# Patient Record
Sex: FEMALE | Race: WHITE | NOT HISPANIC OR LATINO | Employment: FULL TIME | URBAN - METROPOLITAN AREA
[De-identification: names, ages, dates, MRNs, and addresses within clinical notes are randomized per-mention and may not be internally consistent; named-entity substitution may affect disease eponyms.]

---

## 2017-11-22 ENCOUNTER — CLINICAL SUPPORT (OUTPATIENT)
Dept: INTERNAL MEDICINE | Facility: CLINIC | Age: 62
End: 2017-11-22

## 2017-11-22 ENCOUNTER — TELEPHONE (OUTPATIENT)
Dept: INTERNAL MEDICINE | Facility: CLINIC | Age: 62
End: 2017-11-22

## 2017-11-22 DIAGNOSIS — Z00.00 ROUTINE GENERAL MEDICAL EXAMINATION AT A HEALTH CARE FACILITY: ICD-10-CM

## 2017-11-22 DIAGNOSIS — Z00.00 ROUTINE GENERAL MEDICAL EXAMINATION AT A HEALTH CARE FACILITY: Primary | ICD-10-CM

## 2017-11-22 LAB
25(OH)D3 SERPL-MCNC: 45 NG/ML
ALBUMIN SERPL-MCNC: 4.5 G/DL (ref 3.2–4.8)
ALBUMIN/GLOB SERPL: 2 G/DL (ref 1.5–2.5)
ALP SERPL-CCNC: 90 U/L (ref 25–100)
ALT SERPL W P-5'-P-CCNC: 29 U/L (ref 7–40)
ANION GAP SERPL CALCULATED.3IONS-SCNC: 3 MMOL/L (ref 3–11)
ARTICHOKE IGE QN: 147 MG/DL (ref 0–130)
AST SERPL-CCNC: 38 U/L (ref 0–33)
BASOPHILS # BLD AUTO: 0.03 10*3/MM3 (ref 0–0.2)
BASOPHILS NFR BLD AUTO: 0.6 % (ref 0–1)
BILIRUB SERPL-MCNC: 0.7 MG/DL (ref 0.3–1.2)
BUN BLD-MCNC: 14 MG/DL (ref 9–23)
BUN/CREAT SERPL: 15.6 (ref 7–25)
CALCIUM SPEC-SCNC: 9.7 MG/DL (ref 8.7–10.4)
CHLORIDE SERPL-SCNC: 98 MMOL/L (ref 99–109)
CHOLEST SERPL-MCNC: 254 MG/DL (ref 0–200)
CO2 SERPL-SCNC: 32 MMOL/L (ref 20–31)
CREAT BLD-MCNC: 0.9 MG/DL (ref 0.6–1.3)
DEPRECATED RDW RBC AUTO: 45.8 FL (ref 37–54)
EOSINOPHIL # BLD AUTO: 0.12 10*3/MM3 (ref 0–0.3)
EOSINOPHIL NFR BLD AUTO: 2.3 % (ref 0–3)
ERYTHROCYTE [DISTWIDTH] IN BLOOD BY AUTOMATED COUNT: 12.7 % (ref 11.3–14.5)
GFR SERPL CREATININE-BSD FRML MDRD: 63 ML/MIN/1.73
GLOBULIN UR ELPH-MCNC: 2.2 GM/DL
GLUCOSE BLD-MCNC: 87 MG/DL (ref 70–100)
HCT VFR BLD AUTO: 42.3 % (ref 34.5–44)
HDLC SERPL-MCNC: 98 MG/DL (ref 40–60)
HGB BLD-MCNC: 14.2 G/DL (ref 11.5–15.5)
IMM GRANULOCYTES # BLD: 0.01 10*3/MM3 (ref 0–0.03)
IMM GRANULOCYTES NFR BLD: 0.2 % (ref 0–0.6)
LYMPHOCYTES # BLD AUTO: 2.03 10*3/MM3 (ref 0.6–4.8)
LYMPHOCYTES NFR BLD AUTO: 39 % (ref 24–44)
MCH RBC QN AUTO: 33 PG (ref 27–31)
MCHC RBC AUTO-ENTMCNC: 33.6 G/DL (ref 32–36)
MCV RBC AUTO: 98.4 FL (ref 80–99)
MONOCYTES # BLD AUTO: 0.49 10*3/MM3 (ref 0–1)
MONOCYTES NFR BLD AUTO: 9.4 % (ref 0–12)
NEUTROPHILS # BLD AUTO: 2.52 10*3/MM3 (ref 1.5–8.3)
NEUTROPHILS NFR BLD AUTO: 48.5 % (ref 41–71)
PLATELET # BLD AUTO: 186 10*3/MM3 (ref 150–450)
PMV BLD AUTO: 9.6 FL (ref 6–12)
POTASSIUM BLD-SCNC: 4.3 MMOL/L (ref 3.5–5.5)
PROT SERPL-MCNC: 6.7 G/DL (ref 5.7–8.2)
RBC # BLD AUTO: 4.3 10*6/MM3 (ref 3.89–5.14)
SODIUM BLD-SCNC: 133 MMOL/L (ref 132–146)
TRIGL SERPL-MCNC: 64 MG/DL (ref 0–150)
TSH SERPL DL<=0.05 MIU/L-ACNC: 2.74 MIU/ML (ref 0.35–5.35)
VIT B12 BLD-MCNC: 1655 PG/ML (ref 211–911)
WBC NRBC COR # BLD: 5.2 10*3/MM3 (ref 3.5–10.8)

## 2017-11-22 PROCEDURE — 85025 COMPLETE CBC W/AUTO DIFF WBC: CPT | Performed by: INTERNAL MEDICINE

## 2017-11-22 PROCEDURE — 82306 VITAMIN D 25 HYDROXY: CPT | Performed by: INTERNAL MEDICINE

## 2017-11-22 PROCEDURE — 80053 COMPREHEN METABOLIC PANEL: CPT | Performed by: INTERNAL MEDICINE

## 2017-11-22 PROCEDURE — 82607 VITAMIN B-12: CPT | Performed by: INTERNAL MEDICINE

## 2017-11-22 PROCEDURE — 80061 LIPID PANEL: CPT | Performed by: INTERNAL MEDICINE

## 2017-11-22 PROCEDURE — 84443 ASSAY THYROID STIM HORMONE: CPT | Performed by: INTERNAL MEDICINE

## 2017-11-29 ENCOUNTER — OFFICE VISIT (OUTPATIENT)
Dept: INTERNAL MEDICINE | Facility: CLINIC | Age: 62
End: 2017-11-29

## 2017-11-29 VITALS
HEIGHT: 67 IN | RESPIRATION RATE: 12 BRPM | OXYGEN SATURATION: 98 % | DIASTOLIC BLOOD PRESSURE: 68 MMHG | BODY MASS INDEX: 24.17 KG/M2 | HEART RATE: 64 BPM | WEIGHT: 154 LBS | SYSTOLIC BLOOD PRESSURE: 114 MMHG | TEMPERATURE: 97.3 F

## 2017-11-29 DIAGNOSIS — Z78.0 POSTMENOPAUSAL: ICD-10-CM

## 2017-11-29 DIAGNOSIS — Z00.00 ANNUAL PHYSICAL EXAM: Primary | ICD-10-CM

## 2017-11-29 DIAGNOSIS — M25.552 LEFT HIP PAIN: ICD-10-CM

## 2017-11-29 DIAGNOSIS — Z01.419 ENCOUNTER FOR GYNECOLOGICAL EXAMINATION WITHOUT ABNORMAL FINDING: ICD-10-CM

## 2017-11-29 LAB
BILIRUB BLD-MCNC: NEGATIVE MG/DL
CLARITY, POC: CLEAR
COLOR UR: YELLOW
DEVELOPER EXPIRATION DATE: 0
DEVELOPER LOT NUMBER: 0
EXPIRATION DATE: 0
FECAL OCCULT BLOOD SCREEN, POC: NEGATIVE
GLUCOSE UR STRIP-MCNC: NEGATIVE MG/DL
KETONES UR QL: NEGATIVE
LEUKOCYTE EST, POC: NEGATIVE
Lab: 0
NEGATIVE CONTROL: NEGATIVE
NITRITE UR-MCNC: NEGATIVE MG/ML
PH UR: 6 [PH] (ref 5–8)
POSITIVE CONTROL: POSITIVE
PROT UR STRIP-MCNC: NEGATIVE MG/DL
RBC # UR STRIP: NEGATIVE /UL
SP GR UR: 1.01 (ref 1–1.03)
UROBILINOGEN UR QL: NORMAL

## 2017-11-29 PROCEDURE — 81003 URINALYSIS AUTO W/O SCOPE: CPT | Performed by: INTERNAL MEDICINE

## 2017-11-29 PROCEDURE — 99396 PREV VISIT EST AGE 40-64: CPT | Performed by: INTERNAL MEDICINE

## 2017-11-29 PROCEDURE — 82270 OCCULT BLOOD FECES: CPT | Performed by: INTERNAL MEDICINE

## 2017-11-29 RX ORDER — VALACYCLOVIR HYDROCHLORIDE 1 G/1
TABLET, FILM COATED ORAL
Qty: 90 TABLET | Refills: 3 | Status: SHIPPED | OUTPATIENT
Start: 2017-11-29 | End: 2017-11-29 | Stop reason: SDUPTHER

## 2017-11-29 RX ORDER — VALACYCLOVIR HYDROCHLORIDE 1 G/1
1000 TABLET, FILM COATED ORAL DAILY
Qty: 90 TABLET | Refills: 3 | Status: SHIPPED | OUTPATIENT
Start: 2017-11-29 | End: 2018-11-30

## 2017-11-29 NOTE — PROGRESS NOTES
"Subjective   Palma Francisco is a 62 y.o. female.     History of Present Illness     Here for physical/pap     L lateral  hip pain on and off for several months. Will use aleve prn and helps some. Hurts going up and down stairs, and getting out of a chair. Also laying on it at night hurts. Could get up to 5-6/10 in severity.  R wrist hurts some, no numbness and tingling.     Exercise - tries to walk regualrly, gets 10,000 steps 5d/week, and goes to [employer]  Diet - healthy generally, but has some restrictions w/ her GI problems. Some red meat Taking supplements - B12, has been off Caltrate for about 2 months (ran out). MVi daily    The following portions of the patient's history were reviewed and updated as appropriate: allergies, current medications, past family history, past medical history, past social history, past surgical history and problem list.    Review of Systems   Constitutional: Negative for activity change, appetite change, fatigue, fever and unexpected weight change.   HENT: Negative.    Respiratory: Negative for shortness of breath.    Cardiovascular: Negative for chest pain and leg swelling.   Gastrointestinal: Negative for abdominal pain.        H/o IBS   Genitourinary:        Not using the premarin vaginal cream, didn't think it made much difference   Musculoskeletal: Positive for arthralgias (L hip intermittently). Negative for back pain and joint swelling.   Skin: Negative.    Hematological: Negative.        Objective   Blood pressure 114/68, pulse 64, temperature 97.3 °F (36.3 °C), temperature source Temporal Artery , resp. rate 12, height 66.7\" (169.4 cm), weight 154 lb (69.9 kg), SpO2 98 %.  Physical Exam   Constitutional: She is oriented to person, place, and time. She appears well-developed and well-nourished. No distress.   HENT:   Head: Normocephalic and atraumatic.   Right Ear: Tympanic membrane and external ear normal.   Left Ear: Tympanic membrane and external ear normal.   Mouth/Throat: " Oropharynx is clear and moist. No oropharyngeal exudate.   Eyes: Conjunctivae and EOM are normal. Pupils are equal, round, and reactive to light.   Neck: Neck supple. Carotid bruit is not present. No thyromegaly present.   Cardiovascular: Normal rate, regular rhythm and intact distal pulses.    No murmur heard.  Pulmonary/Chest: Effort normal and breath sounds normal. No accessory muscle usage. No respiratory distress. She has no wheezes. She has no rales. Right breast exhibits no inverted nipple, no mass, no nipple discharge and no tenderness. Left breast exhibits no inverted nipple, no mass, no nipple discharge and no tenderness. Breasts are symmetrical.   Abdominal: Soft. Bowel sounds are normal. She exhibits no distension and no mass. There is no tenderness.   Genitourinary: Vagina normal and uterus normal. Rectal exam shows guaiac negative stool. No vaginal discharge found.   Musculoskeletal: Normal range of motion. She exhibits no edema or deformity.   Tender over L hip. Hip int rotation is normal   Lymphadenopathy:     She has no cervical adenopathy.   Neurological: She is alert and oriented to person, place, and time. She has normal reflexes.   Skin: Skin is warm and dry. No rash noted.   Psychiatric: She has a normal mood and affect. Her behavior is normal. Judgment and thought content normal.     Labs reviewed and look good overall - LDL up just a little, but HDL good.     Assessment/Plan   Palma was seen today for annual exam.    Diagnoses and all orders for this visit:    Annual physical exam -DEXA due in 12/17 - will order. Had zostavax 9/13. Colon was done in 10/13 (polyp, Dr Gamboa) - f/u probably '18. Dt due in '24.Regular exercise/healthy diet. BSE q month. Sunscreen use encouraged. She has already had flu vaccine this fall. She has lisbeth scheduled for 12/17. Can decrease her B12 dose  -     POCT urinalysis dipstick, automated  -     POC Occult Blood Stool  -     Liquid-based Pap Smear, Screening -  ThinPrep Vial, Cervix; Future    Encounter for gynecological examination without abnormal finding  -     POC Occult Blood Stool  -     Liquid-based Pap Smear, Screening - ThinPrep Vial, Cervix; Future    Left hip pain - ? bursitis  -     XR Hip With or Without Pelvis 2 - 3 View Left    Postmenopausal  -     DEXA Bone Density Axial

## 2017-12-15 ENCOUNTER — HOSPITAL ENCOUNTER (OUTPATIENT)
Dept: GENERAL RADIOLOGY | Facility: HOSPITAL | Age: 62
Discharge: HOME | End: 2017-12-15
Admitting: INTERNAL MEDICINE

## 2017-12-15 ENCOUNTER — TELEPHONE (OUTPATIENT)
Dept: INTERNAL MEDICINE | Facility: CLINIC | Age: 62
End: 2017-12-15

## 2017-12-15 DIAGNOSIS — R21 RASH: ICD-10-CM

## 2017-12-15 PROCEDURE — 73502 X-RAY EXAM HIP UNI 2-3 VIEWS: CPT

## 2017-12-18 ENCOUNTER — TELEPHONE (OUTPATIENT)
Dept: INTERNAL MEDICINE | Facility: CLINIC | Age: 62
End: 2017-12-18

## 2017-12-18 NOTE — TELEPHONE ENCOUNTER
----- Message from Mikayla Branham MD sent at 12/18/2017  7:51 AM EST -----  Can you let her know, the hip xray does show mild arthritis in both hips. Would she like to see orthopedics?

## 2017-12-19 NOTE — TELEPHONE ENCOUNTER
Ortho could do an injection in the hip if they feel bursitis is more the cause of her symptoms then the arthritis, but it would not hurt to try PT, so if she would like to do PT, I can put in order for this

## 2017-12-26 ENCOUNTER — HOSPITAL ENCOUNTER (OUTPATIENT)
Dept: BONE DENSITY | Facility: HOSPITAL | Age: 62
Discharge: HOME | End: 2017-12-26
Admitting: INTERNAL MEDICINE

## 2017-12-26 PROCEDURE — 77080 DXA BONE DENSITY AXIAL: CPT

## 2018-10-09 ENCOUNTER — TELEPHONE (OUTPATIENT)
Dept: INTERNAL MEDICINE | Facility: CLINIC | Age: 63
End: 2018-10-09

## 2018-10-09 DIAGNOSIS — H91.90 DECREASED HEARING, UNSPECIFIED LATERALITY: Primary | ICD-10-CM

## 2018-10-09 NOTE — TELEPHONE ENCOUNTER
PT WANTS HEARING TEST SHE STATES SHE NEEDS AUTH SENT TO [facility] FAX NUMBER IS [phone]

## 2018-11-30 ENCOUNTER — OFFICE VISIT (OUTPATIENT)
Dept: INTERNAL MEDICINE | Facility: CLINIC | Age: 63
End: 2018-11-30

## 2018-11-30 VITALS
SYSTOLIC BLOOD PRESSURE: 126 MMHG | TEMPERATURE: 98.4 F | BODY MASS INDEX: 23.04 KG/M2 | WEIGHT: 146.8 LBS | DIASTOLIC BLOOD PRESSURE: 78 MMHG | HEIGHT: 67 IN | OXYGEN SATURATION: 98 % | HEART RATE: 58 BPM

## 2018-11-30 DIAGNOSIS — I49.9 IRREGULAR HEART BEATS: ICD-10-CM

## 2018-11-30 DIAGNOSIS — Z23 NEED FOR HEPATITIS A IMMUNIZATION: ICD-10-CM

## 2018-11-30 DIAGNOSIS — Z00.00 ENCOUNTER FOR ANNUAL PHYSICAL EXAM: Primary | ICD-10-CM

## 2018-11-30 DIAGNOSIS — E04.1 THYROID NODULE: ICD-10-CM

## 2018-11-30 LAB
ALBUMIN SERPL-MCNC: 4.38 G/DL (ref 3.2–4.8)
ALBUMIN/GLOB SERPL: 2.3 G/DL (ref 1.5–2.5)
ALP SERPL-CCNC: 76 U/L (ref 25–100)
ALT SERPL W P-5'-P-CCNC: 25 U/L (ref 7–40)
ANION GAP SERPL CALCULATED.3IONS-SCNC: 9 MMOL/L (ref 3–11)
ARTICHOKE IGE QN: 129 MG/DL (ref 0–130)
AST SERPL-CCNC: 32 U/L (ref 0–33)
BASOPHILS # BLD AUTO: 0.02 10*3/MM3 (ref 0–0.2)
BASOPHILS NFR BLD AUTO: 0.4 % (ref 0–1)
BILIRUB BLD-MCNC: NEGATIVE MG/DL
BILIRUB SERPL-MCNC: 0.7 MG/DL (ref 0.3–1.2)
BUN BLD-MCNC: 14 MG/DL (ref 9–23)
BUN/CREAT SERPL: 18.2 (ref 7–25)
CALCIUM SPEC-SCNC: 9.1 MG/DL (ref 8.7–10.4)
CHLORIDE SERPL-SCNC: 93 MMOL/L (ref 99–109)
CHOLEST SERPL-MCNC: 241 MG/DL (ref 0–200)
CLARITY, POC: CLEAR
CO2 SERPL-SCNC: 26 MMOL/L (ref 20–31)
COLOR UR: YELLOW
CREAT BLD-MCNC: 0.77 MG/DL (ref 0.6–1.3)
DEPRECATED RDW RBC AUTO: 46.4 FL (ref 37–54)
EOSINOPHIL # BLD AUTO: 0.06 10*3/MM3 (ref 0–0.3)
EOSINOPHIL NFR BLD AUTO: 1.1 % (ref 0–3)
ERYTHROCYTE [DISTWIDTH] IN BLOOD BY AUTOMATED COUNT: 12.8 % (ref 11.3–14.5)
GFR SERPL CREATININE-BSD FRML MDRD: 75 ML/MIN/1.73
GLOBULIN UR ELPH-MCNC: 1.9 GM/DL
GLUCOSE BLD-MCNC: 83 MG/DL (ref 70–100)
GLUCOSE UR STRIP-MCNC: NEGATIVE MG/DL
HCT VFR BLD AUTO: 39.6 % (ref 34.5–44)
HDLC SERPL-MCNC: 93 MG/DL (ref 40–60)
HGB BLD-MCNC: 13.5 G/DL (ref 11.5–15.5)
IMM GRANULOCYTES # BLD: 0.01 10*3/MM3 (ref 0–0.03)
IMM GRANULOCYTES NFR BLD: 0.2 % (ref 0–0.6)
KETONES UR QL: NEGATIVE
LEUKOCYTE EST, POC: NEGATIVE
LYMPHOCYTES # BLD AUTO: 1.64 10*3/MM3 (ref 0.6–4.8)
LYMPHOCYTES NFR BLD AUTO: 30.6 % (ref 24–44)
MCH RBC QN AUTO: 33.5 PG (ref 27–31)
MCHC RBC AUTO-ENTMCNC: 34.1 G/DL (ref 32–36)
MCV RBC AUTO: 98.3 FL (ref 80–99)
MONOCYTES # BLD AUTO: 0.5 10*3/MM3 (ref 0–1)
MONOCYTES NFR BLD AUTO: 9.3 % (ref 0–12)
NEUTROPHILS # BLD AUTO: 3.13 10*3/MM3 (ref 1.5–8.3)
NEUTROPHILS NFR BLD AUTO: 58.4 % (ref 41–71)
NITRITE UR-MCNC: NEGATIVE MG/ML
PH UR: 6 [PH] (ref 5–8)
PLATELET # BLD AUTO: 204 10*3/MM3 (ref 150–450)
PMV BLD AUTO: 9.5 FL (ref 6–12)
POTASSIUM BLD-SCNC: 3.9 MMOL/L (ref 3.5–5.5)
PROT SERPL-MCNC: 6.3 G/DL (ref 5.7–8.2)
PROT UR STRIP-MCNC: NEGATIVE MG/DL
RBC # BLD AUTO: 4.03 10*6/MM3 (ref 3.89–5.14)
RBC # UR STRIP: NEGATIVE /UL
SODIUM BLD-SCNC: 128 MMOL/L (ref 132–146)
SP GR UR: 1.01 (ref 1–1.03)
TRIGL SERPL-MCNC: 50 MG/DL (ref 0–150)
TSH SERPL DL<=0.05 MIU/L-ACNC: 1.78 MIU/ML (ref 0.35–5.35)
UROBILINOGEN UR QL: NORMAL
WBC NRBC COR # BLD: 5.36 10*3/MM3 (ref 3.5–10.8)

## 2018-11-30 PROCEDURE — 90471 IMMUNIZATION ADMIN: CPT | Performed by: INTERNAL MEDICINE

## 2018-11-30 PROCEDURE — 99397 PER PM REEVAL EST PAT 65+ YR: CPT | Performed by: INTERNAL MEDICINE

## 2018-11-30 PROCEDURE — 93000 ELECTROCARDIOGRAM COMPLETE: CPT | Performed by: INTERNAL MEDICINE

## 2018-11-30 PROCEDURE — 81003 URINALYSIS AUTO W/O SCOPE: CPT | Performed by: INTERNAL MEDICINE

## 2018-11-30 PROCEDURE — 80053 COMPREHEN METABOLIC PANEL: CPT | Performed by: INTERNAL MEDICINE

## 2018-11-30 PROCEDURE — 84443 ASSAY THYROID STIM HORMONE: CPT | Performed by: INTERNAL MEDICINE

## 2018-11-30 PROCEDURE — 80061 LIPID PANEL: CPT | Performed by: INTERNAL MEDICINE

## 2018-11-30 PROCEDURE — 85025 COMPLETE CBC W/AUTO DIFF WBC: CPT | Performed by: INTERNAL MEDICINE

## 2018-11-30 PROCEDURE — 90632 HEPA VACCINE ADULT IM: CPT | Performed by: INTERNAL MEDICINE

## 2018-11-30 RX ORDER — VALACYCLOVIR HYDROCHLORIDE 500 MG/1
500 TABLET, FILM COATED ORAL DAILY
Qty: 90 TABLET | Refills: 3 | Status: SHIPPED | OUTPATIENT
Start: 2018-11-30 | End: 2019-12-02 | Stop reason: SDUPTHER

## 2018-11-30 NOTE — PROGRESS NOTES
Subjective   Palma Francisco is a 63 y.o. female.     History of Present Illness     Here for a physical    Exercise - tries to walk 3d/week; wts a few days a week  Diet - healthy overall - has to avoid fruits and raw veggies w/ her IBS; uses konsyl and levsin; MVI. [place] milk    She had lifeline screen done in 9/18 and was told there was a left thyroid nodule. The carotid and peripheral vascular screening were negative    The following portions of the patient's history were reviewed and updated as appropriate: allergies, current medications, past family history, past medical history, past social history, past surgical history and problem list.    Review of Systems   Constitutional: Negative for activity change, appetite change, unexpected weight gain and unexpected weight loss.   Respiratory: Positive for shortness of breath (occasionally SOB w. exertion).    Cardiovascular: Negative for chest pain.   Gastrointestinal: Negative for constipation and diarrhea.        H/o IBS   Genitourinary: Negative for frequency.       Objective   Physical Exam   Constitutional: She is oriented to person, place, and time. She appears well-developed and well-nourished. No distress.   HENT:   Head: Normocephalic and atraumatic.   Right Ear: External ear normal.   Left Ear: External ear normal.   Mouth/Throat: No oropharyngeal exudate.   Eyes: Conjunctivae and EOM are normal. Pupils are equal, round, and reactive to light.   Neck: Normal range of motion. Neck supple. No thyromegaly (no thyroid nodules appreciated) present.   Cardiovascular: Normal rate, regular rhythm and normal heart sounds. Exam reveals no friction rub.   No murmur heard.  A few extra beats    Pulmonary/Chest: Effort normal and breath sounds normal. No respiratory distress. She has no wheezes. She has no rales. Right breast exhibits no inverted nipple, no mass, no skin change and no tenderness. Left breast exhibits no inverted nipple, no mass, no skin change and no  "tenderness.   Abdominal: Soft. Bowel sounds are normal. She exhibits no distension. There is no tenderness.   Musculoskeletal: She exhibits no edema, tenderness or deformity.   Lymphadenopathy:     She has no cervical adenopathy.   Neurological: She is alert and oriented to person, place, and time. No cranial nerve deficit.   Skin: Skin is warm and dry. No rash noted. She is not diaphoretic. No erythema. No pallor.   Psychiatric: She has a normal mood and affect. Her behavior is normal. Judgment and thought content normal.      Vitals:    11/30/18 1028   BP: 126/78   BP Location: Right arm   Pulse: 58   Temp: 98.4 °F (36.9 °C)   TempSrc: Temporal   SpO2: 98%   Weight: 66.6 kg (146 lb 12.8 oz)   Height: 169.4 cm (66.7\")       ECG 12 Lead  Date/Time: 11/30/2018 11:13 AM  Performed by: Mikayla Branham MD  Authorized by: Mikayla Branham MD   Rhythm: sinus bradycardia  Rate: bradycardic  Conduction: conduction normal  ST Segments: ST segments normal  T Waves: T waves normal  QRS axis: normal  Clinical impression: normal ECG            Assessment/Plan   Palma was seen today for annual exam and med refill.    Diagnoses and all orders for this visit:    Encounter for annual physical exam  DEXA due in '19   Had zostavax 9/13 - shingrix discussed -  can check at pharmacy since we do not have   Hep A today  Pneumonia vaccines are due, but she does not want to get today (with hep A) but will check at pharmacy or back here in a month   Colon was done in 10/13 (polyp, Dr Gamboa) - f/u probably '18 - she did see Dr Gamboa earlier this year. I asked her to call his office and see when they want her to do   Dt due in '24.  Regular exercise/healthy diet. BSE q month. Sunscreen use encouraged.   She has already had flu vaccine this fall.   Mateo - due 12/18 - she has scheduled    -     POC Urinalysis Dipstick, Automated  -     Comprehensive Metabolic Panel  -     Lipid Panel  -     TSH  -     CBC & Differential  -     CBC Auto " Differential    Irregular heart beats on exam, but EKG is normal today  -     ECG 12 Lead    Need for hepatitis A immunization  -     Hepatitis A Vaccine Adult IM    Thyroid nodule seen on lifeline screen. None appreciated today; will get US  -     US Thyroid; Future

## 2018-12-03 DIAGNOSIS — E87.1 HYPONATREMIA: Primary | ICD-10-CM

## 2018-12-11 ENCOUNTER — APPOINTMENT (OUTPATIENT)
Dept: ULTRASOUND IMAGING | Facility: HOSPITAL | Age: 63
End: 2018-12-11
Attending: INTERNAL MEDICINE

## 2018-12-11 ENCOUNTER — HOSPITAL ENCOUNTER (OUTPATIENT)
Dept: ULTRASOUND IMAGING | Facility: HOSPITAL | Age: 63
Discharge: HOME | End: 2018-12-11
Attending: INTERNAL MEDICINE | Admitting: INTERNAL MEDICINE

## 2018-12-11 DIAGNOSIS — E04.1 THYROID NODULE: ICD-10-CM

## 2018-12-11 PROCEDURE — 76536 US EXAM OF HEAD AND NECK: CPT

## 2018-12-18 ENCOUNTER — TELEPHONE (OUTPATIENT)
Dept: INTERNAL MEDICINE | Facility: CLINIC | Age: 63
End: 2018-12-18

## 2018-12-18 NOTE — TELEPHONE ENCOUNTER
PT CALLED COMING IN FOR LABS SHE WANTS PNEU SHOT ALSO PLEASE HAVE ORDER READY AND NEEDS THYROID RESULTS

## 2018-12-18 NOTE — TELEPHONE ENCOUNTER
Lab order is in  She can get prevnar (she wanted to do it a few weeks after her hep A shot)  Her thyroid US showed just a small cyst - nothing worrisome. We can get a f/u ultrasound in 6 months to make sure it is not changing

## 2018-12-20 ENCOUNTER — LAB (OUTPATIENT)
Dept: INTERNAL MEDICINE | Facility: CLINIC | Age: 63
End: 2018-12-20

## 2018-12-20 DIAGNOSIS — E87.1 HYPONATREMIA: ICD-10-CM

## 2018-12-20 LAB
ANION GAP SERPL CALCULATED.3IONS-SCNC: 2 MMOL/L (ref 3–11)
BUN BLD-MCNC: 15 MG/DL (ref 9–23)
BUN/CREAT SERPL: 17.6 (ref 7–25)
CALCIUM SPEC-SCNC: 9.5 MG/DL (ref 8.7–10.4)
CHLORIDE SERPL-SCNC: 98 MMOL/L (ref 99–109)
CO2 SERPL-SCNC: 31 MMOL/L (ref 20–31)
CREAT BLD-MCNC: 0.85 MG/DL (ref 0.6–1.3)
GFR SERPL CREATININE-BSD FRML MDRD: 67 ML/MIN/1.73
GLUCOSE BLD-MCNC: 69 MG/DL (ref 70–100)
POTASSIUM BLD-SCNC: 4.1 MMOL/L (ref 3.5–5.5)
SODIUM BLD-SCNC: 131 MMOL/L (ref 132–146)

## 2018-12-20 PROCEDURE — 80048 BASIC METABOLIC PNL TOTAL CA: CPT | Performed by: INTERNAL MEDICINE

## 2018-12-22 DIAGNOSIS — E87.1 HYPONATREMIA: Primary | ICD-10-CM

## 2018-12-24 ENCOUNTER — TELEPHONE (OUTPATIENT)
Dept: INTERNAL MEDICINE | Facility: CLINIC | Age: 63
End: 2018-12-24

## 2018-12-24 NOTE — TELEPHONE ENCOUNTER
----- Message from Mikayla Branham MD sent at 12/22/2018  9:11 PM EST -----  Can you let her know, her repeat sodium is better and just one point below normal. I'd like to recheck in 2-3 months just to make sure no lower

## 2019-06-20 ENCOUNTER — TELEPHONE (OUTPATIENT)
Dept: INTERNAL MEDICINE | Facility: CLINIC | Age: 64
End: 2019-06-20

## 2019-06-20 NOTE — TELEPHONE ENCOUNTER
----- Message from Mikayla Branham MD sent at 6/20/2019  7:55 AM EDT -----  Regarding: Thyroid ultrasound  She is due for her six-month follow-up thyroid ultrasound.  Is she okay if we get this scheduled?

## 2019-07-18 ENCOUNTER — OFFICE VISIT (OUTPATIENT)
Dept: INTERNAL MEDICINE | Facility: CLINIC | Age: 64
End: 2019-07-18

## 2019-07-18 VITALS
WEIGHT: 151.2 LBS | TEMPERATURE: 97.9 F | HEIGHT: 67 IN | BODY MASS INDEX: 23.73 KG/M2 | DIASTOLIC BLOOD PRESSURE: 68 MMHG | SYSTOLIC BLOOD PRESSURE: 134 MMHG

## 2019-07-18 DIAGNOSIS — R22.0 LEFT FACIAL SWELLING: Primary | ICD-10-CM

## 2019-07-18 PROCEDURE — 99213 OFFICE O/P EST LOW 20 MIN: CPT | Performed by: INTERNAL MEDICINE

## 2019-07-18 RX ORDER — AMOXICILLIN AND CLAVULANATE POTASSIUM 875; 125 MG/1; MG/1
1 TABLET, FILM COATED ORAL 2 TIMES DAILY
Qty: 14 TABLET | Refills: 0 | Status: SHIPPED | OUTPATIENT
Start: 2019-07-18 | End: 2019-12-02

## 2019-07-18 NOTE — PROGRESS NOTES
"Subjective   Palma Francisco is a 64 y.o. female.     History of Present Illness     Patient presents with   • Cyst     2 nights ago,she felt sensitivity just below left ear lobe with swelling.  She was achey all over took an aleve and went to bed.  Next morning was smaller but tender.  Last night had swelling down jaw and above left eyebrow.  No discomfort orally.     No pain in the ear itself, but does have hearing aids now and has not used the hearing aid on the left ear for a few days in case it was contributing.  Not a lot of sinus drainage but does feel little sinus pain in the left frontal sinus.  She has tried aleve 1 each night over the last 2 nights - did feel like it helped  No pain with eating.  No pain in TMJ.  Did see dentist last month and does have a tooth that needs to be capped but is not causing any pain.    The following portions of the patient's history were reviewed and updated as appropriate: allergies, current medications, past family history, past medical history, past social history, past surgical history and problem list.    Review of Systems   Constitutional: Positive for unexpected weight gain. Negative for fever and unexpected weight loss.   HENT: Positive for facial swelling, hearing loss (she is using hearing aids now), rhinorrhea (some, clear), sinus pressure (l frontal), sneezing and sore throat (occasionally). Negative for congestion, ear pain and postnasal drip.    Musculoskeletal: Positive for neck pain.   Skin: Negative for rash and skin lesions.   Allergic/Immunologic: Negative for immunocompromised state.   Neurological: Positive for headache. Negative for facial asymmetry and memory problem.       Objective   /68 (BP Location: Right arm)   Temp 97.9 °F (36.6 °C) (Temporal)   Ht 169.4 cm (66.7\")   Wt 68.6 kg (151 lb 3.2 oz)   BMI 23.89 kg/m²   Physical Exam   Constitutional: She is oriented to person, place, and time. She appears well-developed and well-nourished. No " distress.   HENT:   Head: Normocephalic and atraumatic.   Right Ear: External ear normal.   Left Ear: External ear normal.   Mouth/Throat: Oropharynx is clear and moist. No oropharyngeal exudate.   Small nodule 6mm in front of left ear -slightly tender.  No erythema.  No sinus tenderness   Eyes: Conjunctivae and EOM are normal. Pupils are equal, round, and reactive to light. Right eye exhibits no discharge. Left eye exhibits no discharge.   Neck: Normal range of motion. Neck supple. No thyromegaly present.   Cardiovascular: Normal rate and regular rhythm.   Pulmonary/Chest: Effort normal and breath sounds normal.   Musculoskeletal: She exhibits no edema.   Lymphadenopathy:     She has no cervical adenopathy.   Neurological: She is alert and oriented to person, place, and time. No cranial nerve deficit.   Skin: Skin is warm and dry. No rash noted. She is not diaphoretic.   Psychiatric: She has a normal mood and affect. Her behavior is normal. Judgment and thought content normal.   Nursing note and vitals reviewed.      Assessment/Plan   Palma was seen today for cyst.    Diagnoses and all orders for this visit:    Left facial swelling -will cover with Augmentin in case its parotitis.  We will have her follow-up in 4 weeks and make sure the nodule is gone.  She will call if any worsening.  Also patient will be on the look out for any rash consistent with shingles.  -     amoxicillin-clavulanate (AUGMENTIN) 875-125 MG per tablet; Take 1 tablet by mouth 2 (Two) Times a Day.        She prefers to wait on US if thyroid til she comes in for physical

## 2019-08-15 ENCOUNTER — OFFICE VISIT (OUTPATIENT)
Dept: INTERNAL MEDICINE | Facility: CLINIC | Age: 64
End: 2019-08-15

## 2019-08-15 VITALS
DIASTOLIC BLOOD PRESSURE: 72 MMHG | OXYGEN SATURATION: 96 % | WEIGHT: 149.2 LBS | TEMPERATURE: 97.6 F | BODY MASS INDEX: 23.42 KG/M2 | HEART RATE: 61 BPM | SYSTOLIC BLOOD PRESSURE: 124 MMHG | HEIGHT: 67 IN

## 2019-08-15 DIAGNOSIS — R22.0 LEFT FACIAL SWELLING: Primary | ICD-10-CM

## 2019-08-15 PROCEDURE — 99213 OFFICE O/P EST LOW 20 MIN: CPT | Performed by: INTERNAL MEDICINE

## 2019-08-15 NOTE — PROGRESS NOTES
"Subjective   Palma Francisco is a 64 y.o. female.     History of Present Illness     Here for f/u on:    Left sided neck swelling and pain-patient was here a month ago.  She had some sinus symptoms and we treated presumptively with Augmentin. She does feel it is better - doesn't feel the knot anymore in front of the ear. A few times she has felt that there may be some puffiness over the left jaw, but never developed into anything further.  She did get left hearing aid refitted, but is still having trouble inserting it    She had had a CO2 treatment on her face through dermatology and has had some red splotches on the face since then.  She followed up with them and they did give her some B5 ointment.  This does help.  She is wondering if there is anything else she can use because she is almost out of it    The following portions of the patient's history were reviewed and updated as appropriate: allergies, current medications, past family history, past medical history, past social history, past surgical history and problem list.    Review of Systems   Constitutional: Negative for fever.   HENT: Positive for hearing loss. Negative for ear pain, postnasal drip and sore throat.    Respiratory: Negative for shortness of breath.    Cardiovascular: Negative for chest pain.   Skin: Positive for color change and skin lesions.   Allergic/Immunologic: Negative for immunocompromised state.   Neurological: Negative for seizures, memory problem and confusion.   Psychiatric/Behavioral: Negative for agitation.       Objective   /72 (BP Location: Right arm)   Pulse 61   Temp 97.6 °F (36.4 °C) (Temporal)   Ht 169.4 cm (66.7\")   Wt 67.7 kg (149 lb 3.2 oz)   SpO2 96%   BMI 23.58 kg/m²   Physical Exam   Constitutional: She is oriented to person, place, and time. She appears well-developed and well-nourished. No distress.   HENT:   Head: Normocephalic and atraumatic.   Right Ear: External ear normal.   Left Ear: External ear " normal.   Left ear canal is more torrtuous than R.  No tenderness over left jaw or left lateral neck   Eyes: Conjunctivae and EOM are normal. Pupils are equal, round, and reactive to light. Right eye exhibits no discharge. Left eye exhibits no discharge.   Neck: Normal range of motion. Neck supple.   Cardiovascular: Normal rate and regular rhythm.   Pulmonary/Chest: Effort normal and breath sounds normal.   Musculoskeletal: She exhibits no edema.   Lymphadenopathy:     She has no cervical adenopathy.   Neurological: She is alert and oriented to person, place, and time. No cranial nerve deficit.   Skin: Skin is warm and dry. No rash noted. She is not diaphoretic.   Psychiatric: She has a normal mood and affect. Her behavior is normal. Judgment and thought content normal.   Nursing note and vitals reviewed.        Assessment/Plan   Palma was seen today for facial swelling.    Diagnoses and all orders for this visit:    Left facial swelling    swelling has resolved.    Facial lesions - Sample of Eucrisa given for places she gets on her face    Hearing aids - I showed her how she can pull up on ear to more easily insert the hearing aid on the left

## 2019-12-01 NOTE — PROGRESS NOTES
Here for physical    Exercise - tries to walk at work on breaks regualrly  Diet - healthy overall - has to avoid fruits and raw veggies w/ her IBS; uses konsyl and levsin; MVI. Drinks [place] milk. Did have cereal for breakfast about 8 hrs ago    Pain between scapula -2 episodes - the first one was 10 days ago and was more severe. Was at work and felt pressure in mid back across back. No radiation into chest, arms, or neck. No nausea or stomach upset, no swallowng problems. Did feel diaphoretic. No SOB. Had to put her head down at her desk because of intensity, but then passed after about 5 minutes and felt fine the rest of the day, except maybe a little light-headed. Had a milder episode about 5 days ago that was similar but shorter and not as painful.      Current Outpatient Medications:   •  amoxicillin-clavulanate (AUGMENTIN) 875-125 MG per tablet, Take 1 tablet by mouth 2 (Two) Times a Day., Disp: 14 tablet, Rfl: 0  •  fluocinonide-emollient (LIDEX-E) 0.05 % cream, Apply  topically 2 (Two) Times a Day. Use for 7-14 days, Disp: 30 g, Rfl: 0  •  Ginkgo Biloba (GNP GINGKO BILOBA EXTRACT PO), Take 1 capsule by mouth 2 (Two) Times a Day., Disp: , Rfl:   •  hyoscyamine (ANASPAZ,LEVSIN) 0.125 MG tablet, Take 0.125 mg by mouth Every 4 (Four) Hours As Needed for cramping., Disp: , Rfl:   •  Psyllium (KONSYL) 100 % powder, Take 1 teaspoon(s) by mouth Daily., Disp: , Rfl:   •  sodium chloride (MONI 128) 5 % ophthalmic solution, Administer 1 drop to both eyes As Needed., Disp: , Rfl:   •  TURMERIC PO, Take 1 capsule by mouth Daily., Disp: , Rfl:   •  valACYclovir (VALTREX) 500 MG tablet, Take 1 tablet by mouth Daily., Disp: 90 tablet, Rfl: 3    The following portions of the patient's history were reviewed and updated as appropriate: allergies, current medications, past family history, past medical history, past social history, past surgical history and problem list.    Review of Systems   Constitutional: Negative for  "activity change, appetite change, fever, unexpected weight gain and unexpected weight loss.   HENT: Positive for hearing loss (wears aids).    Eyes: Negative.    Respiratory: Negative for shortness of breath and wheezing.    Cardiovascular: Negative for chest pain, palpitations and leg swelling.   Gastrointestinal: Negative.    Endocrine: Negative.    Genitourinary: Positive for urgency (somtimes). Negative for difficulty urinating and dysuria.   Musculoskeletal: Positive for back pain.   Skin: Negative.    Allergic/Immunologic: Negative for immunocompromised state.   Neurological: Negative for seizures, speech difficulty, memory problem and confusion.   Hematological: Does not bruise/bleed easily.   Psychiatric/Behavioral: Negative for agitation.         Objective    /76 (BP Location: Right arm)   Pulse 69   Temp 97.9 °F (36.6 °C) (Temporal)   Ht 168.9 cm (66.5\")   Wt 70.6 kg (155 lb 9.6 oz)   SpO2 97%   BMI 24.74 kg/m²   Physical Exam   Physical Exam   Constitutional: She is oriented to person, place, and time. She appears well-developed and well-nourished. No distress.   HENT:   Head: Normocephalic and atraumatic.   Right Ear: External ear normal.   Left Ear: External ear normal.   Nose: Nose normal.   Mouth/Throat: Oropharynx is clear and moist. No oropharyngeal exudate.   Eyes: Conjunctivae and EOM are normal. Pupils are equal, round, and reactive to light. Right eye exhibits no discharge. Left eye exhibits no discharge. No scleral icterus.   Neck: Normal range of motion. Neck supple. No thyromegaly present.   Cardiovascular: Normal rate, regular rhythm, normal heart sounds and intact distal pulses. Exam reveals no gallop and no friction rub.   No murmur heard.  Pulmonary/Chest: Effort normal and breath sounds normal. No respiratory distress. She has no wheezes. She has no rales. Right breast exhibits no mass, no nipple discharge, no skin change and no tenderness. Left breast exhibits no mass, no " nipple discharge, no skin change and no tenderness.   Abdominal: Soft. Bowel sounds are normal. She exhibits no distension and no mass. There is no tenderness. There is no rebound and no guarding.   Musculoskeletal: Normal range of motion. She exhibits no edema or deformity.   Lymphadenopathy:     She has no cervical adenopathy.   Neurological: She is alert and oriented to person, place, and time. She displays normal reflexes. Coordination normal.   Skin: Skin is warm and dry. No rash noted. She is not diaphoretic. No erythema. No pallor.   Psychiatric: She has a normal mood and affect. Her behavior is normal. Judgment and thought content normal.   Nursing note and vitals reviewed.      ECG 12 Lead  Date/Time: 12/2/2019 1:42 PM  Performed by: Mikayla Branham MD  Authorized by: Mikayla Branham MD   Comparison: compared with previous ECG from 11/30/2018  Rhythm: sinus rhythm  Rate: normal  ST Segments: ST segments normal  T Waves: T waves normal  QRS axis: normal  Other: no other findings    Clinical impression: normal ECG            Assessment/Plan   Palma was seen today for annual exam and med refill.    Diagnoses and all orders for this visit:    Routine general medical examination at a health care facility  Regular exercise/healthy diet. BSE q month. Sunscreen use encouraged. calcium intake reviewed. Check fasting labs  DEXA due now   Had zostavax 9/13 - shingrix discussed -  can check at pharmacy since we do not have   2nd Hep A  - she declines today  Pneumonia vaccines are due - will give prevnar today   Colon was done in 10/13 (polyp, Dr Gamboa)  - she will check w/ his office and see if she needs this   Dt due in '24.  [facility] - is scheduled for this month  Flu vaccine - she had  -     POC Urinalysis Dipstick, Automated  -     Comprehensive Metabolic Panel  -     CBC & Differential  -     Lipid Panel  -     TSH  -     CBC Auto Differential    Need for pneumococcal vaccination  -     Pneumococcal Conjugate  Vaccine 13-[facility]    Screening for osteoporosis  -     DEXA Bone Density Axial; Future    Chest wall pain - between scapula- 2 episodes - ekg normal today. We discussed additional workup, cxr. She prefers to wait and see if she has any recurrences. She will let me know  -     ECG 12 Lead    Other orders    -     valACYclovir (VALTREX) 500 MG tablet; Take 1 tablet by mouth Daily.

## 2019-12-02 ENCOUNTER — OFFICE VISIT (OUTPATIENT)
Dept: INTERNAL MEDICINE | Facility: CLINIC | Age: 64
End: 2019-12-02

## 2019-12-02 VITALS
HEIGHT: 67 IN | HEART RATE: 69 BPM | OXYGEN SATURATION: 97 % | TEMPERATURE: 97.9 F | WEIGHT: 155.6 LBS | SYSTOLIC BLOOD PRESSURE: 122 MMHG | BODY MASS INDEX: 24.42 KG/M2 | DIASTOLIC BLOOD PRESSURE: 76 MMHG

## 2019-12-02 DIAGNOSIS — Z13.820 SCREENING FOR OSTEOPOROSIS: ICD-10-CM

## 2019-12-02 DIAGNOSIS — R07.89 CHEST WALL PAIN: ICD-10-CM

## 2019-12-02 DIAGNOSIS — Z23 NEED FOR PNEUMOCOCCAL VACCINATION: ICD-10-CM

## 2019-12-02 DIAGNOSIS — Z00.00 ROUTINE GENERAL MEDICAL EXAMINATION AT A HEALTH CARE FACILITY: Primary | ICD-10-CM

## 2019-12-02 LAB
BILIRUB BLD-MCNC: NEGATIVE MG/DL
CLARITY, POC: CLEAR
COLOR UR: YELLOW
GLUCOSE UR STRIP-MCNC: NEGATIVE MG/DL
KETONES UR QL: NEGATIVE
LEUKOCYTE EST, POC: NEGATIVE
NITRITE UR-MCNC: NEGATIVE MG/ML
PH UR: 6.5 [PH] (ref 5–8)
PROT UR STRIP-MCNC: NEGATIVE MG/DL
RBC # UR STRIP: NEGATIVE /UL
SP GR UR: 1.01 (ref 1–1.03)
UROBILINOGEN UR QL: NORMAL

## 2019-12-02 PROCEDURE — 93000 ELECTROCARDIOGRAM COMPLETE: CPT | Performed by: INTERNAL MEDICINE

## 2019-12-02 PROCEDURE — 90471 IMMUNIZATION ADMIN: CPT | Performed by: INTERNAL MEDICINE

## 2019-12-02 PROCEDURE — 99397 PER PM REEVAL EST PAT 65+ YR: CPT | Performed by: INTERNAL MEDICINE

## 2019-12-02 PROCEDURE — 85025 COMPLETE CBC W/AUTO DIFF WBC: CPT | Performed by: INTERNAL MEDICINE

## 2019-12-02 PROCEDURE — 90670 PCV13 VACCINE IM: CPT | Performed by: INTERNAL MEDICINE

## 2019-12-02 PROCEDURE — 84443 ASSAY THYROID STIM HORMONE: CPT | Performed by: INTERNAL MEDICINE

## 2019-12-02 PROCEDURE — 81003 URINALYSIS AUTO W/O SCOPE: CPT | Performed by: INTERNAL MEDICINE

## 2019-12-02 PROCEDURE — 80061 LIPID PANEL: CPT | Performed by: INTERNAL MEDICINE

## 2019-12-02 PROCEDURE — 80053 COMPREHEN METABOLIC PANEL: CPT | Performed by: INTERNAL MEDICINE

## 2019-12-02 RX ORDER — VALACYCLOVIR HYDROCHLORIDE 500 MG/1
500 TABLET, FILM COATED ORAL DAILY
Qty: 90 TABLET | Refills: 3 | Status: SHIPPED | OUTPATIENT
Start: 2019-12-02 | End: 2020-12-22 | Stop reason: SDUPTHER

## 2019-12-03 LAB
ALBUMIN SERPL-MCNC: 5.1 G/DL (ref 3.5–5.2)
ALBUMIN/GLOB SERPL: 1.8 G/DL
ALP SERPL-CCNC: 92 U/L (ref 39–117)
ALT SERPL W P-5'-P-CCNC: 29 U/L (ref 1–33)
ANION GAP SERPL CALCULATED.3IONS-SCNC: 14 MMOL/L (ref 5–15)
AST SERPL-CCNC: 41 U/L (ref 1–32)
BASOPHILS # BLD AUTO: 0.04 10*3/MM3 (ref 0–0.2)
BASOPHILS NFR BLD AUTO: 0.6 % (ref 0–1.5)
BILIRUB SERPL-MCNC: 0.4 MG/DL (ref 0.2–1.2)
BUN BLD-MCNC: 15 MG/DL (ref 8–23)
BUN/CREAT SERPL: 21.4 (ref 7–25)
CALCIUM SPEC-SCNC: 10.2 MG/DL (ref 8.6–10.5)
CHLORIDE SERPL-SCNC: 94 MMOL/L (ref 98–107)
CHOLEST SERPL-MCNC: 272 MG/DL (ref 0–200)
CO2 SERPL-SCNC: 27 MMOL/L (ref 22–29)
CREAT BLD-MCNC: 0.7 MG/DL (ref 0.57–1)
DEPRECATED RDW RBC AUTO: 44 FL (ref 37–54)
EOSINOPHIL # BLD AUTO: 0.09 10*3/MM3 (ref 0–0.4)
EOSINOPHIL NFR BLD AUTO: 1.4 % (ref 0.3–6.2)
ERYTHROCYTE [DISTWIDTH] IN BLOOD BY AUTOMATED COUNT: 12.4 % (ref 12.3–15.4)
GFR SERPL CREATININE-BSD FRML MDRD: 84 ML/MIN/1.73
GLOBULIN UR ELPH-MCNC: 2.8 GM/DL
GLUCOSE BLD-MCNC: 91 MG/DL (ref 65–99)
HCT VFR BLD AUTO: 41.8 % (ref 34–46.6)
HDLC SERPL-MCNC: 110 MG/DL (ref 40–60)
HGB BLD-MCNC: 14.1 G/DL (ref 12–15.9)
IMM GRANULOCYTES # BLD AUTO: 0.02 10*3/MM3 (ref 0–0.05)
IMM GRANULOCYTES NFR BLD AUTO: 0.3 % (ref 0–0.5)
LDLC SERPL CALC-MCNC: 148 MG/DL (ref 0–100)
LDLC/HDLC SERPL: 1.35 {RATIO}
LYMPHOCYTES # BLD AUTO: 2.07 10*3/MM3 (ref 0.7–3.1)
LYMPHOCYTES NFR BLD AUTO: 32.8 % (ref 19.6–45.3)
MCH RBC QN AUTO: 32.4 PG (ref 26.6–33)
MCHC RBC AUTO-ENTMCNC: 33.7 G/DL (ref 31.5–35.7)
MCV RBC AUTO: 96.1 FL (ref 79–97)
MONOCYTES # BLD AUTO: 0.44 10*3/MM3 (ref 0.1–0.9)
MONOCYTES NFR BLD AUTO: 7 % (ref 5–12)
NEUTROPHILS # BLD AUTO: 3.66 10*3/MM3 (ref 1.7–7)
NEUTROPHILS NFR BLD AUTO: 57.9 % (ref 42.7–76)
NRBC BLD AUTO-RTO: 0 /100 WBC (ref 0–0.2)
PLATELET # BLD AUTO: 235 10*3/MM3 (ref 140–450)
PMV BLD AUTO: 9.6 FL (ref 6–12)
POTASSIUM BLD-SCNC: 4.1 MMOL/L (ref 3.5–5.2)
PROT SERPL-MCNC: 7.9 G/DL (ref 6–8.5)
RBC # BLD AUTO: 4.35 10*6/MM3 (ref 3.77–5.28)
SODIUM BLD-SCNC: 135 MMOL/L (ref 136–145)
TRIGL SERPL-MCNC: 70 MG/DL (ref 0–150)
TSH SERPL DL<=0.05 MIU/L-ACNC: 4.93 UIU/ML (ref 0.27–4.2)
VLDLC SERPL-MCNC: 14 MG/DL (ref 5–40)
WBC NRBC COR # BLD: 6.32 10*3/MM3 (ref 3.4–10.8)

## 2019-12-04 DIAGNOSIS — R74.8 ELEVATED LIVER ENZYMES: ICD-10-CM

## 2019-12-04 DIAGNOSIS — E03.9 HYPOTHYROIDISM (ACQUIRED): Primary | ICD-10-CM

## 2020-01-14 ENCOUNTER — LAB (OUTPATIENT)
Dept: INTERNAL MEDICINE | Facility: CLINIC | Age: 65
End: 2020-01-14

## 2020-01-14 DIAGNOSIS — E03.9 HYPOTHYROIDISM (ACQUIRED): ICD-10-CM

## 2020-01-14 DIAGNOSIS — R74.8 ELEVATED LIVER ENZYMES: ICD-10-CM

## 2020-01-14 LAB
ALBUMIN SERPL-MCNC: 4.4 G/DL (ref 3.5–5.2)
ALP SERPL-CCNC: 85 U/L (ref 39–117)
ALT SERPL W P-5'-P-CCNC: 21 U/L (ref 1–33)
AST SERPL-CCNC: 29 U/L (ref 1–32)
BILIRUB CONJ SERPL-MCNC: <0.2 MG/DL (ref 0.2–0.3)
BILIRUB INDIRECT SERPL-MCNC: ABNORMAL MG/DL
BILIRUB SERPL-MCNC: 0.4 MG/DL (ref 0.2–1.2)
PROT SERPL-MCNC: 7.1 G/DL (ref 6–8.5)
T4 FREE SERPL-MCNC: 1.04 NG/DL (ref 0.93–1.7)
TSH SERPL DL<=0.05 MIU/L-ACNC: 5.32 UIU/ML (ref 0.27–4.2)

## 2020-01-14 PROCEDURE — 80076 HEPATIC FUNCTION PANEL: CPT | Performed by: INTERNAL MEDICINE

## 2020-01-14 PROCEDURE — 84439 ASSAY OF FREE THYROXINE: CPT | Performed by: INTERNAL MEDICINE

## 2020-01-14 PROCEDURE — 84443 ASSAY THYROID STIM HORMONE: CPT | Performed by: INTERNAL MEDICINE

## 2020-01-27 ENCOUNTER — TELEPHONE (OUTPATIENT)
Dept: INTERNAL MEDICINE | Facility: CLINIC | Age: 65
End: 2020-01-27

## 2020-01-27 RX ORDER — LEVOTHYROXINE SODIUM 0.03 MG/1
25 TABLET ORAL DAILY
Qty: 30 TABLET | Refills: 2 | Status: SHIPPED | OUTPATIENT
Start: 2020-01-27 | End: 2020-04-22

## 2020-01-27 NOTE — TELEPHONE ENCOUNTER
PN and she will take the thyroid medication.  Send to [facility].

## 2020-01-27 NOTE — TELEPHONE ENCOUNTER
----- Message from Mikayla Branham MD sent at 1/25/2020 10:29 AM EST -----  Can you let her know, the thyroid level is still low, so if she is ok w/ it, we can go ahead and start synthroid and recheck the thyroid labs in 3 months. She would take on an empty stomach, and it is usually well-toelrated.  Her liver labs are back to normal

## 2020-03-24 ENCOUNTER — TELEPHONE (OUTPATIENT)
Dept: INTERNAL MEDICINE | Facility: CLINIC | Age: 65
End: 2020-03-24

## 2020-03-24 DIAGNOSIS — E03.9 HYPOTHYROIDISM (ACQUIRED): Primary | ICD-10-CM

## 2020-03-24 NOTE — TELEPHONE ENCOUNTER
I do want to check her thyroid levels before we refill for 90 days. I can put lab order in if she can stop by. Or if she wants to just fill the 30 day and wait another month if she is worried about COVID-19

## 2020-03-24 NOTE — TELEPHONE ENCOUNTER
Received a refill request from [facility] pharmacy stating her insurance only pays for 90 day fill on her levothyroxine and they have 30 days left on her script.  They would like a script for 90 days.  Looks like she will be due for labs.

## 2020-03-24 NOTE — TELEPHONE ENCOUNTER
PN she can either fill the 30 days or get labs now.  She is going to wait on the labs and fill the 30 days.  I let her know the order is in the system.

## 2020-04-01 ENCOUNTER — APPOINTMENT (OUTPATIENT)
Dept: BONE DENSITY | Facility: HOSPITAL | Age: 65
End: 2020-04-01

## 2020-04-22 RX ORDER — LEVOTHYROXINE SODIUM 0.03 MG/1
25 TABLET ORAL DAILY
Qty: 30 TABLET | Refills: 0 | Status: SHIPPED | OUTPATIENT
Start: 2020-04-22 | End: 2020-05-19

## 2020-05-19 ENCOUNTER — TELEPHONE (OUTPATIENT)
Dept: INTERNAL MEDICINE | Facility: CLINIC | Age: 65
End: 2020-05-19

## 2020-05-19 RX ORDER — LEVOTHYROXINE SODIUM 0.03 MG/1
25 TABLET ORAL DAILY
Qty: 30 TABLET | Refills: 0 | Status: SHIPPED | OUTPATIENT
Start: 2020-05-19 | End: 2020-08-18

## 2020-05-19 NOTE — TELEPHONE ENCOUNTER
Left msg for patient to call the office back to get a follow up appt schedule her her thyroid rechecked.

## 2020-05-19 NOTE — TELEPHONE ENCOUNTER
Mikayla Branham MD  [facility] [address] [facility] 2 hours ago (1:17 PM)      Pt does need appt soon so we can recheck thyroid labs    Routing comment

## 2020-05-20 ENCOUNTER — TELEPHONE (OUTPATIENT)
Dept: INTERNAL MEDICINE | Facility: CLINIC | Age: 65
End: 2020-05-20

## 2020-05-20 DIAGNOSIS — E03.9 HYPOTHYROIDISM (ACQUIRED): Primary | ICD-10-CM

## 2020-05-20 NOTE — TELEPHONE ENCOUNTER
MADHAVIM that pt will need to schedule appt in order to get a 90 day supply. A 30 day supply was given as a courtesy until she can make an appt per Dr. Branham

## 2020-05-20 NOTE — TELEPHONE ENCOUNTER
I have scheudled patient an appointment for 6/4/2020 and she said apt was basically for labs on her medication refill.  She wants to know if lab orders can be entered and she will need to be reminded that she needs to get them at [facility]

## 2020-05-20 NOTE — TELEPHONE ENCOUNTER
MERCEDES CALLED ON BEHALF OF THE PATIENT AND STATED THAT THE PATIENT NEEDS A 90 DAY SUPPLY OF HER levothyroxine (SYNTHROID, LEVOTHROID) 25 MCG tablet IN ORDER FOR THE INSURANCE TO PAY FOR IT.    MIKE AT [facility] CALL BACK NUMBER [phone]      PLEASE CALL AND ADVISE

## 2020-05-20 NOTE — TELEPHONE ENCOUNTER
Yes, I will put in a lab order if she wants to get them at 1 of the other labs a few days before her appointment and we can go over the results

## 2020-05-21 NOTE — TELEPHONE ENCOUNTER
Pt notified of lab order in computer and was given [facility]'s phone # so she can call when she arrives. Pt verbalized understanding

## 2020-05-22 ENCOUNTER — LAB (OUTPATIENT)
Dept: LAB | Facility: HOSPITAL | Age: 65
End: 2020-05-22

## 2020-05-22 DIAGNOSIS — E03.9 HYPOTHYROIDISM (ACQUIRED): ICD-10-CM

## 2020-05-22 LAB
ALBUMIN SERPL-MCNC: 4.4 G/DL (ref 3.5–5.2)
ALBUMIN/GLOB SERPL: 2 G/DL
ALP SERPL-CCNC: 80 U/L (ref 39–117)
ALT SERPL W P-5'-P-CCNC: 14 U/L (ref 1–33)
ANION GAP SERPL CALCULATED.3IONS-SCNC: 11.4 MMOL/L (ref 5–15)
AST SERPL-CCNC: 24 U/L (ref 1–32)
BILIRUB SERPL-MCNC: 0.5 MG/DL (ref 0.2–1.2)
BUN BLD-MCNC: 14 MG/DL (ref 8–23)
BUN/CREAT SERPL: 17.3 (ref 7–25)
CALCIUM SPEC-SCNC: 9.2 MG/DL (ref 8.6–10.5)
CHLORIDE SERPL-SCNC: 94 MMOL/L (ref 98–107)
CO2 SERPL-SCNC: 26.6 MMOL/L (ref 22–29)
CREAT BLD-MCNC: 0.81 MG/DL (ref 0.57–1)
GFR SERPL CREATININE-BSD FRML MDRD: 71 ML/MIN/1.73
GLOBULIN UR ELPH-MCNC: 2.2 GM/DL
GLUCOSE BLD-MCNC: 81 MG/DL (ref 65–99)
POTASSIUM BLD-SCNC: 4.1 MMOL/L (ref 3.5–5.2)
PROT SERPL-MCNC: 6.6 G/DL (ref 6–8.5)
SODIUM BLD-SCNC: 132 MMOL/L (ref 136–145)
T4 FREE SERPL-MCNC: 1.18 NG/DL (ref 0.93–1.7)
TSH SERPL DL<=0.05 MIU/L-ACNC: 3.52 UIU/ML (ref 0.27–4.2)

## 2020-05-22 PROCEDURE — 84439 ASSAY OF FREE THYROXINE: CPT

## 2020-05-22 PROCEDURE — 84443 ASSAY THYROID STIM HORMONE: CPT

## 2020-05-22 PROCEDURE — 80053 COMPREHEN METABOLIC PANEL: CPT

## 2020-07-09 ENCOUNTER — APPOINTMENT (OUTPATIENT)
Dept: BONE DENSITY | Facility: HOSPITAL | Age: 65
End: 2020-07-09

## 2020-08-18 RX ORDER — LEVOTHYROXINE SODIUM 0.03 MG/1
TABLET ORAL
Qty: 90 TABLET | Refills: 1 | Status: SHIPPED | OUTPATIENT
Start: 2020-08-18 | End: 2021-01-04

## 2020-12-22 ENCOUNTER — TELEPHONE (OUTPATIENT)
Dept: INTERNAL MEDICINE | Facility: CLINIC | Age: 65
End: 2020-12-22

## 2020-12-22 RX ORDER — VALACYCLOVIR HYDROCHLORIDE 500 MG/1
500 TABLET, FILM COATED ORAL DAILY
Qty: 90 TABLET | Refills: 3 | Status: SHIPPED | OUTPATIENT
Start: 2020-12-22 | End: 2021-08-13 | Stop reason: SDUPTHER

## 2020-12-22 NOTE — TELEPHONE ENCOUNTER
Derek is requesting a refill on her valacyclovir 500 mg tablets.  Last office visit was 12/02/2019 and next visit is with Palma on 01/04/2021 for a physical.

## 2021-01-04 ENCOUNTER — TELEPHONE (OUTPATIENT)
Dept: INTERNAL MEDICINE | Facility: CLINIC | Age: 66
End: 2021-01-04

## 2021-01-04 ENCOUNTER — LAB (OUTPATIENT)
Dept: LAB | Facility: HOSPITAL | Age: 66
End: 2021-01-04

## 2021-01-04 ENCOUNTER — OFFICE VISIT (OUTPATIENT)
Dept: INTERNAL MEDICINE | Facility: CLINIC | Age: 66
End: 2021-01-04

## 2021-01-04 VITALS
DIASTOLIC BLOOD PRESSURE: 84 MMHG | BODY MASS INDEX: 25.9 KG/M2 | WEIGHT: 165 LBS | OXYGEN SATURATION: 97 % | HEIGHT: 67 IN | HEART RATE: 91 BPM | SYSTOLIC BLOOD PRESSURE: 138 MMHG | TEMPERATURE: 97.3 F

## 2021-01-04 DIAGNOSIS — K58.9 IRRITABLE BOWEL SYNDROME, UNSPECIFIED TYPE: ICD-10-CM

## 2021-01-04 DIAGNOSIS — Z13.820 SCREENING FOR OSTEOPOROSIS: ICD-10-CM

## 2021-01-04 DIAGNOSIS — Z12.4 SCREENING FOR MALIGNANT NEOPLASM OF CERVIX: ICD-10-CM

## 2021-01-04 DIAGNOSIS — Z00.00 ANNUAL PHYSICAL EXAM: Primary | ICD-10-CM

## 2021-01-04 DIAGNOSIS — Z12.11 SCREEN FOR COLON CANCER: ICD-10-CM

## 2021-01-04 DIAGNOSIS — Z71.3 DIETARY COUNSELING: ICD-10-CM

## 2021-01-04 DIAGNOSIS — Z13.220 SCREENING, LIPID: ICD-10-CM

## 2021-01-04 DIAGNOSIS — E03.9 HYPOTHYROIDISM (ACQUIRED): ICD-10-CM

## 2021-01-04 DIAGNOSIS — Z13.1 SCREENING FOR DIABETES MELLITUS: ICD-10-CM

## 2021-01-04 LAB
ALBUMIN SERPL-MCNC: 4.8 G/DL (ref 3.5–5.2)
ALBUMIN/GLOB SERPL: 2.1 G/DL
ALP SERPL-CCNC: 97 U/L (ref 39–117)
ALT SERPL W P-5'-P-CCNC: 21 U/L (ref 1–33)
ANION GAP SERPL CALCULATED.3IONS-SCNC: 9.4 MMOL/L (ref 5–15)
AST SERPL-CCNC: 29 U/L (ref 1–32)
BASOPHILS # BLD AUTO: 0.04 10*3/MM3 (ref 0–0.2)
BASOPHILS NFR BLD AUTO: 0.7 % (ref 0–1.5)
BILIRUB BLD-MCNC: NEGATIVE MG/DL
BILIRUB SERPL-MCNC: 0.4 MG/DL (ref 0–1.2)
BUN SERPL-MCNC: 14 MG/DL (ref 8–23)
BUN/CREAT SERPL: 16.5 (ref 7–25)
CALCIUM SPEC-SCNC: 9.7 MG/DL (ref 8.6–10.5)
CHLORIDE SERPL-SCNC: 97 MMOL/L (ref 98–107)
CHOLEST SERPL-MCNC: 257 MG/DL (ref 0–200)
CLARITY, POC: CLEAR
CO2 SERPL-SCNC: 28.6 MMOL/L (ref 22–29)
COLOR UR: YELLOW
CREAT SERPL-MCNC: 0.85 MG/DL (ref 0.57–1)
DEPRECATED RDW RBC AUTO: 43.7 FL (ref 37–54)
DEVELOPER EXPIRATION DATE: NORMAL
DEVELOPER LOT NUMBER: NORMAL
EOSINOPHIL # BLD AUTO: 0.04 10*3/MM3 (ref 0–0.4)
EOSINOPHIL NFR BLD AUTO: 0.7 % (ref 0.3–6.2)
ERYTHROCYTE [DISTWIDTH] IN BLOOD BY AUTOMATED COUNT: 12.3 % (ref 12.3–15.4)
EXPIRATION DATE: NORMAL
FECAL OCCULT BLOOD SCREEN, POC: NEGATIVE
GFR SERPL CREATININE-BSD FRML MDRD: 67 ML/MIN/1.73
GLOBULIN UR ELPH-MCNC: 2.3 GM/DL
GLUCOSE SERPL-MCNC: 100 MG/DL (ref 65–99)
GLUCOSE UR STRIP-MCNC: NEGATIVE MG/DL
HBA1C MFR BLD: 5.49 % (ref 4.8–5.6)
HCT VFR BLD AUTO: 42.4 % (ref 34–46.6)
HDLC SERPL-MCNC: 98 MG/DL (ref 40–60)
HGB BLD-MCNC: 14.4 G/DL (ref 12–15.9)
IMM GRANULOCYTES # BLD AUTO: 0.02 10*3/MM3 (ref 0–0.05)
IMM GRANULOCYTES NFR BLD AUTO: 0.3 % (ref 0–0.5)
KETONES UR QL: NEGATIVE
LDLC SERPL CALC-MCNC: 140 MG/DL (ref 0–100)
LDLC/HDLC SERPL: 1.4 {RATIO}
LEUKOCYTE EST, POC: NEGATIVE
LYMPHOCYTES # BLD AUTO: 1.38 10*3/MM3 (ref 0.7–3.1)
LYMPHOCYTES NFR BLD AUTO: 22.7 % (ref 19.6–45.3)
Lab: NORMAL
MCH RBC QN AUTO: 32.9 PG (ref 26.6–33)
MCHC RBC AUTO-ENTMCNC: 34 G/DL (ref 31.5–35.7)
MCV RBC AUTO: 96.8 FL (ref 79–97)
MONOCYTES # BLD AUTO: 0.37 10*3/MM3 (ref 0.1–0.9)
MONOCYTES NFR BLD AUTO: 6.1 % (ref 5–12)
NEGATIVE CONTROL: NEGATIVE
NEUTROPHILS NFR BLD AUTO: 4.22 10*3/MM3 (ref 1.7–7)
NEUTROPHILS NFR BLD AUTO: 69.5 % (ref 42.7–76)
NITRITE UR-MCNC: NEGATIVE MG/ML
NRBC BLD AUTO-RTO: 0.2 /100 WBC (ref 0–0.2)
PH UR: 6 [PH] (ref 5–8)
PLATELET # BLD AUTO: 216 10*3/MM3 (ref 140–450)
PMV BLD AUTO: 10 FL (ref 6–12)
POSITIVE CONTROL: POSITIVE
POTASSIUM SERPL-SCNC: 4.1 MMOL/L (ref 3.5–5.2)
PROT SERPL-MCNC: 7.1 G/DL (ref 6–8.5)
PROT UR STRIP-MCNC: NEGATIVE MG/DL
RBC # BLD AUTO: 4.38 10*6/MM3 (ref 3.77–5.28)
RBC # UR STRIP: NEGATIVE /UL
SODIUM SERPL-SCNC: 135 MMOL/L (ref 136–145)
SP GR UR: 1.01 (ref 1–1.03)
TRIGL SERPL-MCNC: 110 MG/DL (ref 0–150)
TSH SERPL DL<=0.05 MIU/L-ACNC: 2.66 UIU/ML (ref 0.27–4.2)
UROBILINOGEN UR QL: NORMAL
VLDLC SERPL-MCNC: 19 MG/DL (ref 5–40)
WBC # BLD AUTO: 6.07 10*3/MM3 (ref 3.4–10.8)

## 2021-01-04 PROCEDURE — 80053 COMPREHEN METABOLIC PANEL: CPT | Performed by: PHYSICIAN ASSISTANT

## 2021-01-04 PROCEDURE — 81003 URINALYSIS AUTO W/O SCOPE: CPT | Performed by: PHYSICIAN ASSISTANT

## 2021-01-04 PROCEDURE — 99397 PER PM REEVAL EST PAT 65+ YR: CPT | Performed by: PHYSICIAN ASSISTANT

## 2021-01-04 PROCEDURE — 82270 OCCULT BLOOD FECES: CPT | Performed by: PHYSICIAN ASSISTANT

## 2021-01-04 PROCEDURE — 85025 COMPLETE CBC W/AUTO DIFF WBC: CPT | Performed by: PHYSICIAN ASSISTANT

## 2021-01-04 PROCEDURE — 80061 LIPID PANEL: CPT | Performed by: PHYSICIAN ASSISTANT

## 2021-01-04 PROCEDURE — 84443 ASSAY THYROID STIM HORMONE: CPT | Performed by: PHYSICIAN ASSISTANT

## 2021-01-04 PROCEDURE — 90732 PPSV23 VACC 2 YRS+ SUBQ/IM: CPT | Performed by: PHYSICIAN ASSISTANT

## 2021-01-04 PROCEDURE — 83036 HEMOGLOBIN GLYCOSYLATED A1C: CPT | Performed by: PHYSICIAN ASSISTANT

## 2021-01-04 PROCEDURE — 90471 IMMUNIZATION ADMIN: CPT | Performed by: PHYSICIAN ASSISTANT

## 2021-01-04 RX ORDER — LEVOTHYROXINE SODIUM 25 UG/1
25 CAPSULE ORAL DAILY
Qty: 90 CAPSULE | Refills: 1 | Status: SHIPPED | OUTPATIENT
Start: 2021-01-04 | End: 2021-08-13

## 2021-01-04 RX ORDER — HYOSCYAMINE SULFATE 0.125 MG
0.12 TABLET ORAL EVERY 4 HOURS PRN
Qty: 180 TABLET | Refills: 2 | Status: SHIPPED | OUTPATIENT
Start: 2021-01-04 | End: 2021-08-13 | Stop reason: SDUPTHER

## 2021-01-04 NOTE — PROGRESS NOTES
Palma Francisco 65 y.o. female presents today for a routine physical    Chief Complaint   Patient presents with   • Annual Exam     with pap        HPI   Here for physical    Diet: is eating healthy, has been limiting gluten as it improves her GI sx of IBS and she has read it will help her thyroid as well. Drinks plenty of water and will eat cooked vegis.   Exercise: does walk a few times a week. Tries to get 10k steps in daily when possible.   Dentist/Eye Dr: sees regularly, wears glasses but is due for eye exam.    Mammogram recently was birads 1.   Due for dexa.  Had flu shot this year, is req pneumonia shot and zoster.   Colonoscopy was '13, found a polyp, is due in 10 yr, has not had any change in bowel habits other than her normal IBS.    Hypothyroid: Is taking synthroid regularly. Has noticed some hair loss and is wondering if it is from her thyroid.     IBS: limits raw vegi, leafy greens, dairy products. Takes hyoscyamine in the morning and this helps with GI cramps.        Review of Systems   Constitutional: Negative for chills, fever and unexpected weight loss.   HENT: Negative for congestion, rhinorrhea and sore throat.    Respiratory: Negative for cough, shortness of breath and wheezing.    Cardiovascular: Negative for chest pain and leg swelling.   Gastrointestinal: Negative for blood in stool and vomiting.        +IBS symptoms   Genitourinary: Negative for breast discharge, breast lump, vaginal bleeding and vaginal discharge.   Musculoskeletal: Negative for joint swelling.   Skin: Negative for rash and skin lesions.   Neurological: Negative for dizziness, syncope and headache.   Psychiatric/Behavioral: Negative for depressed mood. The patient is not nervous/anxious.         Patient Active Problem List   Diagnosis   • Herpes simplex   • Disorder of bone and cartilage   • Migraine       Current Outpatient Medications   Medication Sig Dispense Refill   • Ginkgo Biloba (GNP GINGKO BILOBA EXTRACT PO) Take 1  capsule by mouth 2 (Two) Times a Day.     • hyoscyamine (ANASPAZ,LEVSIN) 0.125 MG tablet Take 1 tablet by mouth Every 4 (Four) Hours As Needed for Cramping. 180 tablet 2   • Psyllium (KONSYL) 100 % powder Take 1 teaspoon(s) by mouth Daily.     • sodium chloride (MONI 128) 5 % ophthalmic solution Administer 1 drop to both eyes As Needed.     • TURMERIC PO Take 1 capsule by mouth Daily.     • valACYclovir (Valtrex) 500 MG tablet Take 1 tablet by mouth Daily. 90 tablet 3   • levothyroxine sodium (Tirosint) 25 MCG capsule Take 1 capsule by mouth Daily. 90 capsule 1     No current facility-administered medications for this visit.        Allergies   Allergen Reactions   • Hydrocortisone Acetate Other (See Comments)     Sores on and under tongue.   • Lexapro [Escitalopram] Other (See Comments)     headache        Past Medical History:   Diagnosis Date   • H/O bone density study 12/2015    slightly worse osteopenia B hips, l-spine.  Frax close to needing tx - recheck in 2 yrs   • H/O colonoscopy 10/2013    Dr. Gamboa - polyp   • History of diagnostic tests 09/2015    Angioscreen- nml ABIs, abd us, carotids   • HL (hearing loss) 01/2019   • H[facility] of mammogram [facility]   • Hypertension     sarah BP pregnancy   • Hypothyroidism (acquired) 01/20/2020   • Migraine    • Pap smear for cervical cancer screening 11/29/2017    Dr. Branham        Past Surgical History:   Procedure Laterality Date   • OTHER SURGICAL HISTORY Left 2012    HAND EXCISION - BENIGN ANGIOLEIOMYOMA   • TONSILLECTOMY AND ADENOIDECTOMY          Family History   Adopted: Yes        Social History     Socioeconomic History   • Marital status:      Spouse name: Not on file   • Number of children: Not on file   • Years of education: Not on file   • Highest education level: Not on file   Tobacco Use   • Smoking status: Never Smoker   • Smokeless tobacco: Never Used   Substance and Sexual Activity   • Alcohol use: Yes     Types: 4 Glasses of  "wine per week     Comment: 2-3 drinks/week   • Drug use: No   • Sexual activity: Not Currently     Partners: Male     Birth control/protection: Post-menopausal        Vitals:    01/04/21 0814   BP: 138/84   Pulse: 91   Temp: 97.3 °F (36.3 °C)   SpO2: 97%   Weight: 74.8 kg (165 lb)   Height: 169.2 cm (66.6\")      Body mass index is 26.15 kg/m².    Patient's Body mass index is 26.15 kg/m². BMI is above normal parameters. Recommendations include: exercise counseling and nutrition counseling.      Physical Exam  Vitals signs reviewed.   Constitutional:       General: She is not in acute distress.     Appearance: Normal appearance. She is not ill-appearing.   HENT:      Head: Normocephalic and atraumatic.      Right Ear: Tympanic membrane, ear canal and external ear normal.      Left Ear: Tympanic membrane, ear canal and external ear normal.      Mouth/Throat:      Mouth: Mucous membranes are moist.      Pharynx: No oropharyngeal exudate or posterior oropharyngeal erythema.   Eyes:      General: No scleral icterus.     Extraocular Movements: Extraocular movements intact.      Conjunctiva/sclera: Conjunctivae normal.      Pupils: Pupils are equal, round, and reactive to light.   Neck:      Musculoskeletal: Normal range of motion and neck supple.   Cardiovascular:      Rate and Rhythm: Normal rate and regular rhythm.      Pulses: Normal pulses.      Heart sounds: Normal heart sounds. No murmur.   Pulmonary:      Effort: Pulmonary effort is normal. No respiratory distress.      Breath sounds: Normal breath sounds. No stridor. No wheezing, rhonchi or rales.   Abdominal:      General: Bowel sounds are normal. There is no distension.      Palpations: Abdomen is soft. There is no mass.      Tenderness: There is no abdominal tenderness. There is no guarding.   Genitourinary:     General: Normal vulva.      Vagina: No vaginal discharge.      Rectum: Normal. Guaiac result negative.   Musculoskeletal: Normal range of motion.        "  General: No signs of injury.      Right lower leg: No edema.      Left lower leg: No edema.   Lymphadenopathy:      Cervical: No cervical adenopathy.      Upper Body:      Right upper body: No supraclavicular, axillary or pectoral adenopathy.      Left upper body: No supraclavicular, axillary or pectoral adenopathy.   Skin:     General: Skin is warm and dry.      Coloration: Skin is not jaundiced.      Findings: No rash.   Neurological:      General: No focal deficit present.      Mental Status: She is alert and oriented to person, place, and time.      Gait: Gait normal.   Psychiatric:         Mood and Affect: Mood normal.         Behavior: Behavior normal.          Immunization History   Administered Date(s) Administered   • Flu Mist 09/21/2017   • Flu Vaccine Quad PF >18YRS 09/21/2016   • Fluad Quad 65+ 10/13/2020   • Flulaval/Fluarix/Fluzone Quad 09/27/2018   • Hepatitis A 11/30/2018   • Influenza, Unspecified 09/01/2015, 10/23/2020   • Pneumococcal Conjugate 13-Valent (PCV13) 12/02/2019   • Pneumococcal Polysaccharide (PPSV23) 01/04/2021   • Tdap 10/27/2014   • Zostavax 09/26/2013   • flucelvax quad pfs =>4 YRS 09/26/2019       Health Maintenance   Topic Date Due   • ZOSTER VACCINE (2 of 3) 11/21/2013   • MAMMOGRAM  12/29/2022   • COLONOSCOPY  10/01/2023   • PAP SMEAR  01/04/2024   • TDAP/TD VACCINES (2 - Td) 10/27/2024   • INFLUENZA VACCINE  Completed       Diagnoses and all orders for this visit:    1. Annual physical exam (Primary)  -     POCT urinalysis dipstick, automated  -     hyoscyamine (ANASPAZ,LEVSIN) 0.125 MG tablet; Take 1 tablet by mouth Every 4 (Four) Hours As Needed for Cramping.  Dispense: 180 tablet; Refill: 2    2. Hypothyroidism (acquired)  -     Comprehensive Metabolic Panel  -     CBC Auto Differential  -     TSH Rfx On Abnormal To Free T4  -     levothyroxine sodium (Tirosint) 25 MCG capsule; Take 1 capsule by mouth Daily.  Dispense: 90 capsule; Refill: 1    3. Irritable bowel syndrome,  unspecified type  -     hyoscyamine (ANASPAZ,LEVSIN) 0.125 MG tablet; Take 1 tablet by mouth Every 4 (Four) Hours As Needed for Cramping.  Dispense: 180 tablet; Refill: 2    4. Screening for osteoporosis  -     DEXA Bone Density Axial; Future    5. Screen for colon cancer  -     POCT occult blood x 1 stool    6. Screening for malignant neoplasm of cervix  -     Liquid-based Pap Smear, Screening    7. Screening for diabetes mellitus  -     Hemoglobin A1c    8. Screening, lipid  -     Lipid Panel    9. Dietary counseling    Other orders  -     Pneumococcal Polysaccharide Vaccine 23-Valent (PPSV23) Greater Than or Equal To 1yo Subcutaneous / IM        Counseled on health maintenance topics and preventative care recommendations. Follow up yearly for routine physical exams. Diet and exercise counseling given. See dentist and eye doctor yearly as directed.       Return in about 6 months (around 7/4/2021).    Palma Smith PA-C

## 2021-01-05 ENCOUNTER — TELEPHONE (OUTPATIENT)
Dept: INTERNAL MEDICINE | Facility: CLINIC | Age: 66
End: 2021-01-05

## 2021-01-05 NOTE — PROGRESS NOTES
Please let pt know labs were good except cholesterol was a little high still but a better than last year. Cont to work on diet and exercise daily.

## 2021-01-05 NOTE — TELEPHONE ENCOUNTER
----- Message from Palma Smith PA-C sent at 1/5/2021  1:06 PM EST -----  Please let pt know labs were good except cholesterol was a little high still but a better than last year. Cont to work on diet and exercise daily.

## 2021-01-05 NOTE — TELEPHONE ENCOUNTER
PN of results. She stated understandingl.  She would like copy of labs mailed.  They have been mailed

## 2021-01-06 ENCOUNTER — TELEPHONE (OUTPATIENT)
Dept: INTERNAL MEDICINE | Facility: CLINIC | Age: 66
End: 2021-01-06

## 2021-01-06 NOTE — TELEPHONE ENCOUNTER
----- Message from Palma Smith PA-C sent at 1/6/2021  9:34 AM EST -----  Please let her know her Pap smear was normal and showed no malignancy

## 2021-02-11 ENCOUNTER — OFFICE (OUTPATIENT)
Dept: URBAN - METROPOLITAN AREA CLINIC 4 | Facility: CLINIC | Age: 66
End: 2021-02-11

## 2021-02-11 VITALS — WEIGHT: 163 LBS | HEIGHT: 67 IN

## 2021-02-11 DIAGNOSIS — K58.9 IRRITABLE BOWEL SYNDROME WITHOUT DIARRHEA: ICD-10-CM

## 2021-02-11 DIAGNOSIS — K59.00 CONSTIPATION, UNSPECIFIED: ICD-10-CM

## 2021-02-11 DIAGNOSIS — K21.9 GASTRO-ESOPHAGEAL REFLUX DISEASE WITHOUT ESOPHAGITIS: ICD-10-CM

## 2021-02-11 PROCEDURE — 99214 OFFICE O/P EST MOD 30 MIN: CPT | Mod: 95 | Performed by: NURSE PRACTITIONER

## 2021-05-06 ENCOUNTER — TELEHEALTH PROVIDED OTHER THAN IN PATIENT'S HOME (OUTPATIENT)
Dept: URBAN - METROPOLITAN AREA TELEHEALTH 1 | Facility: TELEHEALTH | Age: 66
End: 2021-05-06

## 2021-05-06 VITALS — HEIGHT: 67 IN | WEIGHT: 160 LBS

## 2021-05-06 DIAGNOSIS — K58.9 IRRITABLE BOWEL SYNDROME WITHOUT DIARRHEA: ICD-10-CM

## 2021-05-06 DIAGNOSIS — R14.3 FLATULENCE: ICD-10-CM

## 2021-05-06 DIAGNOSIS — R14.0 ABDOMINAL DISTENSION (GASEOUS): ICD-10-CM

## 2021-05-06 PROCEDURE — 99214 OFFICE O/P EST MOD 30 MIN: CPT | Mod: 95 | Performed by: NURSE PRACTITIONER

## 2021-05-06 RX ORDER — RIFAXIMIN 550 MG/1
TABLET ORAL
Qty: 42 | Refills: 0 | Status: ACTIVE
Start: 2021-05-06

## 2021-05-07 ENCOUNTER — TRANSCRIBE ORDERS (OUTPATIENT)
Dept: NUTRITION | Facility: HOSPITAL | Age: 66
End: 2021-05-07

## 2021-05-07 DIAGNOSIS — K58.0 IRRITABLE BOWEL SYNDROME WITH DIARRHEA: Primary | ICD-10-CM

## 2021-06-10 ENCOUNTER — HOSPITAL ENCOUNTER (OUTPATIENT)
Dept: NUTRITION | Facility: HOSPITAL | Age: 66
Setting detail: RECURRING SERIES
Discharge: HOME | End: 2021-06-10

## 2021-06-10 VITALS — BODY MASS INDEX: 25.11 KG/M2 | HEIGHT: 67 IN | WEIGHT: 160 LBS

## 2021-06-10 PROCEDURE — 97802 MEDICAL NUTRITION INDIV IN: CPT | Performed by: DIETITIAN, REGISTERED

## 2021-06-10 NOTE — CONSULTS
"Adult Outpatient Nutrition  Assessment/PES    Patient Name:  Palma Francisco  YOB: 1955  MRN: [id number]    Assessment Date:  6/10/2021    Comments:  Telephone nutrition consult, 60 minutes. This medical referred consult was provided as a telephone call, as patient is unable to attend an in-office appointment due to the COVID-19 crisis. Consent for treatment was given verbally.    Patient describes problems with bloating, gas and occasional GERD. She states that she has had \"GI issues\" for many years and through independent research decided to eliminate dairy, gluten, raw fruits, nuts and seeds. She state that this has helped some to alleviate her symptoms. She has also been following bowel regimen of konsyl+miralax daily per GI and states that that has helped as well. She does take hyoscyamin PRN. She state that she did notice a somewhat drastic increase in gas/bloating over the last year. She recently began taking Vital Gut Renew supplements and states that this has resolved symptoms completely. She was referred to me for low FODMAP diet education and was provided with [employer] FODMAP Elimination Toolkit at the time of appointment scheduling. At that time, she was open to doing an elimination diet as she was still symptomatic. However between time of scheduling and appointment she has begun taking Gut Renew supplement (L-glutamine and N-Acetyl-glucosamine, Aloe vera, DGL (Deglycyrrhizinated Licorice) extract (root), Organic Toshia root (Zingibir Officinale), Organic Marshmallow root (Althaea officinalis)). We did discuss the low FODMAP diet. RD presented low FODMAP diet as an elimination diet with the goal of identifying trigger foods as well as tolerated dose of various FODMAPs.  Advised patient to eliminate as many FODMAP foods as possible for the next 2 weeks to reduce inflammation and allow GI rest. After 2 weeks, recommend introducing each FODMAP individually at increasing doses to determine " "tolerance. At this time she is not interested in pursuing low FODMAP elimination as she feels as though her symptoms are well managed and she does not want to further restrict her diet. RD did discuss nutritional balance with patient. 24 hour recall indicates that patient does eat well balanced diet despite restrictions. Patient does report past allergy to corn, however she states she has not been retested in many years. RD did encourage patient to consider re-testing food allergies as they do change over time. Patient is agreeable and asked RD to reach out to PCP to initiate process. We will schedule follow up pending results.    Goals:  - pursue allergy testing  - weight maintenance vs healthful weight loss    Total of 60 minutes spent with patient on nutrition counseling. Education based on Academy of Dietetics and Nutrition guidelines. Patient was provided with RD's contact information. Follow up pending food allergy results. Thank you for this referral.      General Info     Row Name 06/10/21 1168       Today's Session    Person(s) attending today's session  Patient     Services Used Today?  No       General Information    How Well Do You Speak English?  very well    Do You Speak a Language Other Than English at Home?  no    Are you able to read and write English?  Yes    Lives With  spouse    Is patient pregnant?  no       Relationship/Environment    Name(s) of Who Lives With Patient            Anthropometrics     Row Name 06/10/21 6121          Anthropometrics    Height  170.2 cm (67\")     Weight  72.6 kg (160 lb)        Ideal Body Weight (IBW)    Ideal Body Weight (IBW) (kg)  61.86     % Ideal Body Weight  117.32        Body Mass Index (BMI)    BMI (kg/m2)  25.11         Nutritional Info/Activity     Row Name 06/10/21 2279       Nutritional Information    Have you had weight changes?  Yes    Describe weight changes  10 lbs intentional wt loss x 2 months    What is your desired body weight?  " 68 kg (150 lb)    Have you tried to lose weight before?  No    Supplemental Drinks/Foods/Additives  Vital gut renew (glutamine and n-ethyl- glucosamine), MVI, b complex, ginkgo, miralax, konsyl, bone broth, tumeric, curcumin, b12    History of eating disorder?  No    What cultural diet influences are important for you to follow?  none listed    Do you have difficulty chewing food?  No    Functional Status  able to prepare meals;able to purchase food;ambulatory    List any food cravings/trigger foods you have  chocolate, organic blue corn chips    List any food aversions  none disclosed    How often during the day do you find yourself snacking?  102 times    Food Behaviors  Stress eater    How often do you eat out and where?  [place], seafood    Do you use Food Assistance programs (WIC, food stamps, food bank)?  no    Do you need information about Food Assistance programs?  no    How many meals do you eat each day?  3    How many snacks do you eat each day?  2    What is the biggest challenge you have with your diet?  Food causing negative symptoms;Weight maintenance    What type of support do you currently use to help you with your health issues?  exercise club membership, [employer] [email or web address]    Enter everything you can remember eating in the last 24 hours (1 day)  Breakfast: cinnamon cereal with oatmilk, Lunch: GF spinach wrap, turkey, hummus, avocado, emmy-fred cheese, francis slaw, decaf iced tea Dinner: salmon, asparagus, canned beets, mashed potatoes       Eating Environment    Eating environment  Family;Work       Physical Activity    Are you currently involved in an activity/exercise program?   Yes    Describe physical activity  Curves for women, step counting.    How many minutes do you spend on exercise each day?  60    How would you rank exercise as an important health lifestyle practice?  10        Home Nutrition Report     Row Name 06/10/21 4937          Home Nutrition Report    Diet  Self modified      "Supplemental Drinks/Foods/Additives  Vital gut renew (glutamine and n-ethyl- glucosamine), MVI, b complex, ginkgo, miralax, konsyl, bone broth, tumeric, curcumin, b12         Estimated/Assessed Needs     Row Name 06/10/21 7746          Calculation Measurements    Weight Used For Calculations  72.6 kg (160 lb)     Height  170.2 cm (67\")        Estimated/Assessed Needs    Additional Documentation  [place]-St. Jeor Equation (Group)        [place]-St. Jeor Equation    RMR ([place]-St. Jeor Equation)  1288.385     [place]-St. Jeor Activity Factors  1.2     Activity Factors ([place]-St. Jeor)  1546.062           Labs/Tests/Procedures/Meds     Row Name 06/10/21 1644          Labs/Procedures/Meds    Lab Results Reviewed  reviewed        Diagnostic Tests/Procedures    Diagnostic Test/Procedure Reviewed  reviewed        Medications    Pertinent Medications Reviewed  reviewed             Problem/Interventions:  Problem 1     Row Name 06/10/21 1644          Nutrition Diagnoses Problem 1    Problem 1  Altered GI Function     Etiology (related to)  Medical Diagnosis     Gastrointestinal  IBS     Signs/Symptoms (evidenced by)  Report/Observation     Reported/Observed By  MD     Reported GI Symptoms  GI distress                 Intervention Goal     Row Name 06/10/21 1644          Intervention Goal    General  Provide information regarding MNT for treatment/condition;Reduce/improve symptoms;Meet nutritional needs for age/condition     Weight  Appropriate weight loss           Nutrition Prescription     Row Name 06/10/21 164          Nutrition Prescription PO    PO Prescription  Begin/change diet     Begin/Change Diet to  Regular     Fluid Consistency  Thin     New PO Prescription Ordered?  No, recommended         Education/Evaluation     Row Name 06/10/21 9078          Education    Education  Provided education regarding;Education topics;Advised regarding habits/behavior     Provided education regarding  Avoidance/improvement of " symptoms;Diet rationale;Medical diagnosis     Education Topics  GI disease     Advised Regarding Habits/Behavior  Food choices;Eating pattern;Appropriate portions        Monitor/Evaluation    Monitor  Per protocol     Education Follow-up  Other (comment) pending allergy results           Electronically signed by:  Vania Selby RD  06/10/21 16:46 EDT

## 2021-07-20 ENCOUNTER — TELEPHONE (OUTPATIENT)
Dept: NUTRITION | Facility: HOSPITAL | Age: 66
End: 2021-07-20

## 2021-07-20 NOTE — PROGRESS NOTES
Adult Outpatient Nutrition  Assessment/PES    Patient Name:  Palma Francisco  YOB: 1955  MRN: [id number]    Assessment Date:  7/20/2021    Comments:  RD called patient to check in. Patient states that she continues to do well on current regimen. She has tried to reintroduce a few foods (blueberries, raspberries) which caused digestive upset. RD advised to avoid, patient agreeable. Patient states that he has no further questions or concerns at this time. RD provided contact information and instructed to call should further nutrition concerns arise.     Vania Selby RD  07/20/21 13:39 EDT

## 2021-08-12 DIAGNOSIS — E03.9 HYPOTHYROIDISM (ACQUIRED): ICD-10-CM

## 2021-08-12 RX ORDER — LEVOTHYROXINE SODIUM 25 UG/1
25 CAPSULE ORAL DAILY
Qty: 90 CAPSULE | Refills: 1 | OUTPATIENT
Start: 2021-08-12

## 2021-08-13 ENCOUNTER — OFFICE VISIT (OUTPATIENT)
Dept: INTERNAL MEDICINE | Facility: CLINIC | Age: 66
End: 2021-08-13

## 2021-08-13 VITALS
BODY MASS INDEX: 25.05 KG/M2 | HEIGHT: 67 IN | WEIGHT: 159.6 LBS | HEART RATE: 65 BPM | SYSTOLIC BLOOD PRESSURE: 106 MMHG | TEMPERATURE: 96.9 F | DIASTOLIC BLOOD PRESSURE: 70 MMHG | OXYGEN SATURATION: 98 %

## 2021-08-13 DIAGNOSIS — B00.9 HERPES SIMPLEX: ICD-10-CM

## 2021-08-13 DIAGNOSIS — K58.2 IRRITABLE BOWEL SYNDROME WITH BOTH CONSTIPATION AND DIARRHEA: ICD-10-CM

## 2021-08-13 DIAGNOSIS — E03.9 HYPOTHYROIDISM, UNSPECIFIED TYPE: Primary | ICD-10-CM

## 2021-08-13 PROCEDURE — 99214 OFFICE O/P EST MOD 30 MIN: CPT | Performed by: PHYSICIAN ASSISTANT

## 2021-08-13 RX ORDER — VALACYCLOVIR HYDROCHLORIDE 500 MG/1
500 TABLET, FILM COATED ORAL DAILY
Qty: 90 TABLET | Refills: 2 | Status: SHIPPED | OUTPATIENT
Start: 2021-08-13 | End: 2022-06-29

## 2021-08-13 RX ORDER — LEVOTHYROXINE SODIUM 0.03 MG/1
25 TABLET ORAL DAILY
Qty: 90 TABLET | Refills: 3 | Status: SHIPPED | OUTPATIENT
Start: 2021-08-13 | End: 2022-01-12

## 2021-08-13 RX ORDER — LEVOTHYROXINE SODIUM 0.03 MG/1
25 TABLET ORAL DAILY
COMMUNITY
Start: 2021-05-14 | End: 2021-08-13 | Stop reason: SDUPTHER

## 2021-08-13 RX ORDER — HYOSCYAMINE SULFATE 0.125 MG
0.12 TABLET ORAL EVERY 6 HOURS PRN
Qty: 180 TABLET | Refills: 2 | Status: SHIPPED | OUTPATIENT
Start: 2021-08-13 | End: 2022-09-06

## 2021-08-13 NOTE — PROGRESS NOTES
Chief Complaint  Med Refill (levothyroxine )    Subjective          History of Present Illness  Palma Francisco presents to [facility] for   IBS:  Takes hyoscyamine every morning once and then will take an additional one through the day if needed. Has seen GI and a nutritionist, has taken gluten out of her diet and had good results.     Hypothyroid:  Has been well controlled on synthroid 25mg for 1.5 yrs now. Had elevated TSH in 2019, has not had abnormal thyroid labs or dose changes since med was started. No sx of thyroid dz that she has noticed.     Genital Herpes:  No recent outbreaks, has been on this med for years. Has not had any side effects with this medication.       Review of Systems   Constitutional: Negative for fever and unexpected weight loss.   Respiratory: Negative for cough, shortness of breath and wheezing.    Cardiovascular: Negative for chest pain and palpitations.       The following portions of the patient's history were reviewed and updated as appropriate: allergies, current medications, past family history, past medical history, past social history, past surgical history and problem list.  Allergies   Allergen Reactions   • Hydrocortisone Acetate Other (See Comments)     Sores on and under tongue.   • Lexapro [Escitalopram] Other (See Comments)     headache     Current Outpatient Medications on File Prior to Visit   Medication Sig Dispense Refill   • Ginkgo Biloba (GNP GINGKO BILOBA EXTRACT PO) Take 1 capsule by mouth 2 (Two) Times a Day.     • Psyllium (KONSYL) 100 % powder Take 1 teaspoon(s) by mouth Daily.     • sodium chloride (MONI 128) 5 % ophthalmic solution Administer 1 drop to both eyes As Needed.     • TURMERIC PO Take 1 capsule by mouth Daily.     • VITAMIN B COMPLEX-C PO Take  by mouth.     • [DISCONTINUED] hyoscyamine (ANASPAZ,LEVSIN) 0.125 MG tablet Take 1 tablet by mouth Every 4 (Four) Hours As Needed for Cramping. 180 tablet 2   • [DISCONTINUED]  "levothyroxine (SYNTHROID, LEVOTHROID) 25 MCG tablet Take 25 mcg by mouth Daily.     • [DISCONTINUED] levothyroxine sodium (Tirosint) 25 MCG capsule Take 1 capsule by mouth Daily. 90 capsule 1   • [DISCONTINUED] valACYclovir (Valtrex) 500 MG tablet Take 1 tablet by mouth Daily. 90 tablet 3     No current facility-administered medications on file prior to visit.     New Medications Ordered This Visit   Medications   • levothyroxine (SYNTHROID, LEVOTHROID) 25 MCG tablet     Sig: Take 1 tablet by mouth Daily.     Dispense:  90 tablet     Refill:  3   • hyoscyamine (ANASPAZ,LEVSIN) 0.125 MG tablet     Sig: Take 1 tablet by mouth Every 6 (Six) Hours As Needed for Cramping.     Dispense:  180 tablet     Refill:  2   • valACYclovir (Valtrex) 500 MG tablet     Sig: Take 1 tablet by mouth Daily.     Dispense:  90 tablet     Refill:  2       Social History     Tobacco Use   Smoking Status Never Smoker   Smokeless Tobacco Never Used        Objective   Vital Signs:   Vitals:    08/13/21 0808   BP: 106/70   Pulse: 65   Temp: 96.9 °F (36.1 °C)   TempSrc: Temporal   SpO2: 98%   Weight: 72.4 kg (159 lb 9.6 oz)   Height: 170.2 cm (67.01\")      Physical Exam  Vitals reviewed.   Constitutional:       General: She is not in acute distress.     Appearance: Normal appearance.   HENT:      Head: Normocephalic and atraumatic.   Eyes:      General: No scleral icterus.     Extraocular Movements: Extraocular movements intact.      Conjunctiva/sclera: Conjunctivae normal.   Cardiovascular:      Rate and Rhythm: Normal rate and regular rhythm.      Heart sounds: Normal heart sounds. No murmur heard.     Pulmonary:      Effort: Pulmonary effort is normal. No respiratory distress.      Breath sounds: Normal breath sounds. No stridor. No wheezing or rhonchi.   Musculoskeletal:      Cervical back: Normal range of motion and neck supple.   Skin:     General: Skin is warm and dry.      Coloration: Skin is not jaundiced.   Neurological:      General: " No focal deficit present.      Mental Status: She is alert and oriented to person, place, and time.      Gait: Gait normal.   Psychiatric:         Mood and Affect: Mood normal.         Behavior: Behavior normal.          Result Review :{ Labs  Result Review  Imaging  Med Tab  Media :23}                   Assessment and Plan    Diagnoses and all orders for this visit:    1. Hypothyroidism, unspecified type (Primary)  Assessment & Plan:  Well controlled on synthroid 25 for subclinical hypothyroid. Will refill. F/u in 6 mo as dir for wellness check.    Orders:  -     levothyroxine (SYNTHROID, LEVOTHROID) 25 MCG tablet; Take 1 tablet by mouth Daily.  Dispense: 90 tablet; Refill: 3    2. Herpes simplex  Assessment & Plan:  Will cont on Valtrex at this time.    Orders:  -     valACYclovir (Valtrex) 500 MG tablet; Take 1 tablet by mouth Daily.  Dispense: 90 tablet; Refill: 2    3. Irritable bowel syndrome with both constipation and diarrhea  Assessment & Plan:  Cont healthy diet, cont hyoscyamine prn as dir    Orders:  -     hyoscyamine (ANASPAZ,LEVSIN) 0.125 MG tablet; Take 1 tablet by mouth Every 6 (Six) Hours As Needed for Cramping.  Dispense: 180 tablet; Refill: 2        Follow Up   Return in about 6 months (around 2/13/2022) for Annual.      Follow up if symptoms worsen or persist or has new or concerning symptoms, go to ER for severe symptoms.   Reviewed common medication effects and side effects and advised to report side effects immediately, the patient expressed good understanding.  Encouraged medication compliance and the importance of keeping scheduled follow up appointments with me and any other providers  If labs or images are ordered we will contact you with the results within the next week.  If you have not heard from us after a week please call our office to inquire about the results.   Patient was given instructions and counseling regarding her condition or for health maintenance advice. Please see  specific information pulled into the AVS if appropriate.     Palma Smtih PA-C    * Please note that portions of this note may have been completed with a voice recognition program. Efforts were made to edit the dictation but occasionally words are erroneously transcribed.

## 2021-08-13 NOTE — ASSESSMENT & PLAN NOTE
Well controlled on synthroid 25 for subclinical hypothyroid. Will refill. F/u in 6 mo as dir for wellness check.

## 2022-01-10 ENCOUNTER — LAB (OUTPATIENT)
Dept: LAB | Facility: HOSPITAL | Age: 67
End: 2022-01-10

## 2022-01-10 DIAGNOSIS — E03.9 ACQUIRED HYPOTHYROIDISM: ICD-10-CM

## 2022-01-10 DIAGNOSIS — E55.9 VITAMIN D DEFICIENCY: ICD-10-CM

## 2022-01-10 DIAGNOSIS — Z00.00 ROUTINE GENERAL MEDICAL EXAMINATION AT A HEALTH CARE FACILITY: ICD-10-CM

## 2022-01-10 LAB
25(OH)D3 SERPL-MCNC: 36.7 NG/ML
ALBUMIN SERPL-MCNC: 4.8 G/DL (ref 3.5–5.2)
ALBUMIN/GLOB SERPL: 2.2 G/DL
ALP SERPL-CCNC: 102 U/L (ref 39–117)
ALT SERPL W P-5'-P-CCNC: 20 U/L (ref 1–33)
ANION GAP SERPL CALCULATED.3IONS-SCNC: 10.2 MMOL/L (ref 5–15)
AST SERPL-CCNC: 30 U/L (ref 1–32)
BILIRUB SERPL-MCNC: 0.4 MG/DL (ref 0–1.2)
BUN SERPL-MCNC: 14 MG/DL (ref 8–23)
BUN/CREAT SERPL: 16.5 (ref 7–25)
CALCIUM SPEC-SCNC: 9.4 MG/DL (ref 8.6–10.5)
CHLORIDE SERPL-SCNC: 92 MMOL/L (ref 98–107)
CHOLEST SERPL-MCNC: 230 MG/DL (ref 0–200)
CO2 SERPL-SCNC: 27.8 MMOL/L (ref 22–29)
CREAT SERPL-MCNC: 0.85 MG/DL (ref 0.57–1)
DEPRECATED RDW RBC AUTO: 41.7 FL (ref 37–54)
ERYTHROCYTE [DISTWIDTH] IN BLOOD BY AUTOMATED COUNT: 12.1 % (ref 12.3–15.4)
GFR SERPL CREATININE-BSD FRML MDRD: 67 ML/MIN/1.73
GLOBULIN UR ELPH-MCNC: 2.2 GM/DL
GLUCOSE SERPL-MCNC: 92 MG/DL (ref 65–99)
HCT VFR BLD AUTO: 39.8 % (ref 34–46.6)
HDLC SERPL-MCNC: 86 MG/DL (ref 40–60)
HGB BLD-MCNC: 13.9 G/DL (ref 12–15.9)
LDLC SERPL CALC-MCNC: 131 MG/DL (ref 0–100)
LDLC/HDLC SERPL: 1.5 {RATIO}
MCH RBC QN AUTO: 33 PG (ref 26.6–33)
MCHC RBC AUTO-ENTMCNC: 34.9 G/DL (ref 31.5–35.7)
MCV RBC AUTO: 94.5 FL (ref 79–97)
PLATELET # BLD AUTO: 199 10*3/MM3 (ref 140–450)
PMV BLD AUTO: 10.1 FL (ref 6–12)
POTASSIUM SERPL-SCNC: 3.8 MMOL/L (ref 3.5–5.2)
PROT SERPL-MCNC: 7 G/DL (ref 6–8.5)
RBC # BLD AUTO: 4.21 10*6/MM3 (ref 3.77–5.28)
SODIUM SERPL-SCNC: 130 MMOL/L (ref 136–145)
T4 FREE SERPL-MCNC: 1.3 NG/DL (ref 0.93–1.7)
TRIGL SERPL-MCNC: 77 MG/DL (ref 0–150)
TSH SERPL DL<=0.05 MIU/L-ACNC: 4.76 UIU/ML (ref 0.27–4.2)
VLDLC SERPL-MCNC: 13 MG/DL (ref 5–40)
WBC NRBC COR # BLD: 5.91 10*3/MM3 (ref 3.4–10.8)

## 2022-01-10 PROCEDURE — 85027 COMPLETE CBC AUTOMATED: CPT | Performed by: INTERNAL MEDICINE

## 2022-01-10 PROCEDURE — 84443 ASSAY THYROID STIM HORMONE: CPT | Performed by: INTERNAL MEDICINE

## 2022-01-10 PROCEDURE — 80053 COMPREHEN METABOLIC PANEL: CPT | Performed by: INTERNAL MEDICINE

## 2022-01-10 PROCEDURE — 82306 VITAMIN D 25 HYDROXY: CPT | Performed by: INTERNAL MEDICINE

## 2022-01-10 PROCEDURE — 80061 LIPID PANEL: CPT | Performed by: INTERNAL MEDICINE

## 2022-01-10 PROCEDURE — 84439 ASSAY OF FREE THYROXINE: CPT | Performed by: INTERNAL MEDICINE

## 2022-01-12 ENCOUNTER — OFFICE VISIT (OUTPATIENT)
Dept: INTERNAL MEDICINE | Facility: CLINIC | Age: 67
End: 2022-01-12

## 2022-01-12 VITALS
HEIGHT: 66 IN | OXYGEN SATURATION: 100 % | TEMPERATURE: 97.1 F | WEIGHT: 161.2 LBS | SYSTOLIC BLOOD PRESSURE: 130 MMHG | BODY MASS INDEX: 25.91 KG/M2 | HEART RATE: 62 BPM | DIASTOLIC BLOOD PRESSURE: 72 MMHG

## 2022-01-12 DIAGNOSIS — E03.9 ACQUIRED HYPOTHYROIDISM: ICD-10-CM

## 2022-01-12 DIAGNOSIS — Z00.00 ROUTINE GENERAL MEDICAL EXAMINATION AT A HEALTH CARE FACILITY: Primary | ICD-10-CM

## 2022-01-12 DIAGNOSIS — Z13.820 SCREENING FOR OSTEOPOROSIS: ICD-10-CM

## 2022-01-12 DIAGNOSIS — E55.9 VITAMIN D DEFICIENCY: ICD-10-CM

## 2022-01-12 LAB
BILIRUB BLD-MCNC: NEGATIVE MG/DL
CLARITY, POC: CLEAR
COLOR UR: YELLOW
EXPIRATION DATE: ABNORMAL
GLUCOSE UR STRIP-MCNC: NEGATIVE MG/DL
KETONES UR QL: ABNORMAL
LEUKOCYTE EST, POC: NEGATIVE
Lab: ABNORMAL
NITRITE UR-MCNC: NEGATIVE MG/ML
PH UR: 6 [PH] (ref 5–8)
PROT UR STRIP-MCNC: NEGATIVE MG/DL
RBC # UR STRIP: NEGATIVE /UL
SP GR UR: 1.02 (ref 1–1.03)
UROBILINOGEN UR QL: NORMAL

## 2022-01-12 PROCEDURE — 99397 PER PM REEVAL EST PAT 65+ YR: CPT | Performed by: INTERNAL MEDICINE

## 2022-01-12 PROCEDURE — 81003 URINALYSIS AUTO W/O SCOPE: CPT | Performed by: INTERNAL MEDICINE

## 2022-01-12 RX ORDER — LEVOTHYROXINE SODIUM 0.05 MG/1
50 TABLET ORAL DAILY
Qty: 90 TABLET | Refills: 0 | Status: SHIPPED | OUTPATIENT
Start: 2022-01-12 | End: 2022-04-04

## 2022-01-12 RX ORDER — CHOLECALCIFEROL (VITAMIN D3) 125 MCG
500 CAPSULE ORAL DAILY
COMMUNITY
End: 2023-02-16

## 2022-03-28 ENCOUNTER — OFFICE VISIT (OUTPATIENT)
Dept: INTERNAL MEDICINE | Facility: CLINIC | Age: 67
End: 2022-03-28

## 2022-03-28 ENCOUNTER — LAB (OUTPATIENT)
Dept: LAB | Facility: HOSPITAL | Age: 67
End: 2022-03-28

## 2022-03-28 ENCOUNTER — HOSPITAL ENCOUNTER (OUTPATIENT)
Dept: GENERAL RADIOLOGY | Facility: HOSPITAL | Age: 67
Discharge: HOME | End: 2022-03-28

## 2022-03-28 VITALS
SYSTOLIC BLOOD PRESSURE: 104 MMHG | TEMPERATURE: 96.6 F | WEIGHT: 153.2 LBS | OXYGEN SATURATION: 97 % | DIASTOLIC BLOOD PRESSURE: 60 MMHG | BODY MASS INDEX: 24.5 KG/M2 | HEART RATE: 64 BPM | RESPIRATION RATE: 16 BRPM

## 2022-03-28 DIAGNOSIS — E07.9 THYROID DYSFUNCTION: ICD-10-CM

## 2022-03-28 DIAGNOSIS — M79.89 SWELLING OF RIGHT HAND: Primary | ICD-10-CM

## 2022-03-28 DIAGNOSIS — M79.89 SWELLING OF RIGHT HAND: ICD-10-CM

## 2022-03-28 DIAGNOSIS — M54.50 CHRONIC MIDLINE LOW BACK PAIN WITHOUT SCIATICA: ICD-10-CM

## 2022-03-28 DIAGNOSIS — E03.9 ACQUIRED HYPOTHYROIDISM: ICD-10-CM

## 2022-03-28 DIAGNOSIS — E87.1 HYPONATREMIA: ICD-10-CM

## 2022-03-28 DIAGNOSIS — G89.29 CHRONIC MIDLINE LOW BACK PAIN WITHOUT SCIATICA: ICD-10-CM

## 2022-03-28 LAB
ALBUMIN SERPL-MCNC: 4.5 G/DL (ref 3.5–5.2)
ALBUMIN/GLOB SERPL: 1.8 G/DL
ALP SERPL-CCNC: 100 U/L (ref 39–117)
ALT SERPL W P-5'-P-CCNC: 14 U/L (ref 1–33)
ANION GAP SERPL CALCULATED.3IONS-SCNC: 21.3 MMOL/L (ref 5–15)
AST SERPL-CCNC: 24 U/L (ref 1–32)
BILIRUB SERPL-MCNC: 0.4 MG/DL (ref 0–1.2)
BUN SERPL-MCNC: 10 MG/DL (ref 8–23)
BUN/CREAT SERPL: 12 (ref 7–25)
CALCIUM SPEC-SCNC: 10 MG/DL (ref 8.6–10.5)
CHLORIDE SERPL-SCNC: 88 MMOL/L (ref 98–107)
CO2 SERPL-SCNC: 25.7 MMOL/L (ref 22–29)
CREAT SERPL-MCNC: 0.83 MG/DL (ref 0.57–1)
EGFRCR SERPLBLD CKD-EPI 2021: 76.4 ML/MIN/1.73
GLOBULIN UR ELPH-MCNC: 2.5 GM/DL
GLUCOSE SERPL-MCNC: 91 MG/DL (ref 65–99)
POTASSIUM SERPL-SCNC: 4.2 MMOL/L (ref 3.5–5.2)
PROT SERPL-MCNC: 7 G/DL (ref 6–8.5)
SODIUM SERPL-SCNC: 135 MMOL/L (ref 136–145)
TSH SERPL DL<=0.05 MIU/L-ACNC: 0.33 UIU/ML (ref 0.27–4.2)
URATE SERPL-MCNC: 3.7 MG/DL (ref 2.4–5.7)

## 2022-03-28 PROCEDURE — 84443 ASSAY THYROID STIM HORMONE: CPT | Performed by: STUDENT IN AN ORGANIZED HEALTH CARE EDUCATION/TRAINING PROGRAM

## 2022-03-28 PROCEDURE — 36415 COLL VENOUS BLD VENIPUNCTURE: CPT

## 2022-03-28 PROCEDURE — 80053 COMPREHEN METABOLIC PANEL: CPT | Performed by: STUDENT IN AN ORGANIZED HEALTH CARE EDUCATION/TRAINING PROGRAM

## 2022-03-28 PROCEDURE — 99214 OFFICE O/P EST MOD 30 MIN: CPT | Performed by: STUDENT IN AN ORGANIZED HEALTH CARE EDUCATION/TRAINING PROGRAM

## 2022-03-28 PROCEDURE — 84550 ASSAY OF BLOOD/URIC ACID: CPT | Performed by: STUDENT IN AN ORGANIZED HEALTH CARE EDUCATION/TRAINING PROGRAM

## 2022-03-28 PROCEDURE — 72220 X-RAY EXAM SACRUM TAILBONE: CPT

## 2022-03-28 PROCEDURE — 73130 X-RAY EXAM OF HAND: CPT

## 2022-03-28 RX ORDER — METHYLPREDNISOLONE 4 MG/1
TABLET ORAL
Qty: 21 EACH | Refills: 0 | Status: SHIPPED | OUTPATIENT
Start: 2022-03-28 | End: 2023-01-18

## 2022-03-28 NOTE — PROGRESS NOTES
"Chief Complaint  Finger Injury (Was seen at [facility] on 2/12 and thought it was a splinter, she has been doing epsom salt soaks and Zyrtec to help  with no change, last night she noticed increase soreness, swelling, and lump in thumb), Back Pain (Fell off front porch a year ago, now having soreness and increased \"puffiness\" in the same area), and Hypothyroidism (Thinks she is having some side effects from dose of thyroid medication, trouble sleeping and noticing red patches on face)    Palma Francisco presents to [facility]      Subjective     Finger swelling:  Patient notes that her distal index finger and distal thumb has been swollen since February.  She was seen at [facility] on February 12 for this issue and was told to soak in Epsom salts and take Zyrtec.  These interventions have not been helpful.  She notes of increased soreness and swelling in that region as well as a lump around that area as well.  Denies any injuries or trauma to that area.    Hypothyroidism: Patient was seen last by her primary care provider in January and had her levothyroxine increased from 25 to 50 mcg daily.  She has been compliant with this medication.  She has noticed that since the recent dose increase she has been having trouble sleeping and noticed red patches on her face.  Interestingly she is also been using an over-the-counter oil on her face around the same time.  She has since stopped using that product.    Back pain: Patient fell off a porch about 1 year ago and had tailbone pain this initially improved after a few weeks however over the last few months the pain has returned and has been persistent.  She denies any numbness or tingling of her extremities.  She is ambulating without issues.  She denies any other injury since that fall year ago.  Denies fevers or chills.  Denies urinary or bowel incontinence.      The following portions of the patient's history were reviewed and updated " as appropriate: allergies, current medications, past family history, past medical history, past social history, past surgical history and problem list.      Health Maintenance   Topic Date Due   • ZOSTER VACCINE (2 of 3) 11/21/2013   • DXA SCAN  12/26/2019   • ANNUAL PHYSICAL  01/13/2023   • COLORECTAL CANCER SCREENING  10/01/2023   • MAMMOGRAM  12/30/2023   • PAP SMEAR  01/04/2024   • TDAP/TD VACCINES (2 - Td or Tdap) 10/27/2024   • HEPATITIS C SCREENING  Completed   • COVID-19 Vaccine  Completed   • INFLUENZA VACCINE  Completed   • Pneumococcal Vaccine 65+  Completed         Procedures       Past Medical History:   Diagnosis Date   • H/O bone density study 12/2015    slightly worse osteopenia B hips, l-spine.  Frax close to needing tx - recheck in 2 yrs   • H/O colonoscopy 10/2013    Dr. Gamboa - polyp   • History of diagnostic tests 09/2015    Angioscreen- nml ABIs, abd us, carotids   • HL (hearing loss) 01/2019   • [facility] of mammogram [facility]   • Hypertension     sarah BP pregnancy   • Hypothyroidism (acquired) 01/20/2020   • Migraine    • Pap smear for cervical cancer screening 11/29/2017    Dr. Branham      Allergies   Allergen Reactions   • Hydrocortisone Acetate Other (See Comments)     Sores on and under tongue.   • Lexapro [Escitalopram] Other (See Comments)     headache      Social History     Tobacco Use   • Smoking status: Never Smoker   • Smokeless tobacco: Never Used   Vaping Use   • Vaping Use: Never used   Substance Use Topics   • Alcohol use: Not Currently     Comment: stopped drinking 3/2021   • Drug use: No     Past Surgical History:   Procedure Laterality Date   • OTHER SURGICAL HISTORY Left 2012    HAND EXCISION - BENIGN ANGIOLEIOMYOMA   • TONSILLECTOMY AND ADENOIDECTOMY        Family History   Adopted: Yes         Current Outpatient Medications:   •  Ginkgo Biloba (GNP GINGKO BILOBA EXTRACT PO), Take 1 capsule by mouth 2 (Two) Times a Day., Disp: , Rfl:   •  hyoscyamine  (ANASPAZ,LEVSIN) 0.125 MG tablet, Take 1 tablet by mouth Every 6 (Six) Hours As Needed for Cramping., Disp: 180 tablet, Rfl: 2  •  Hypromellose (VISTA GEL DRY EYE RELIEF OP), Apply  to eye(s) as directed by provider., Disp: , Rfl:   •  levothyroxine (Synthroid) 50 MCG tablet, Take 1 tablet by mouth Daily., Disp: 90 tablet, Rfl: 0  •  Psyllium 100 % powder, Take 1 teaspoon(s) by mouth Daily., Disp: , Rfl:   •  sodium chloride (MONI 128) 5 % ophthalmic solution, Administer 1 drop to both eyes As Needed., Disp: , Rfl:   •  TURMERIC PO, Take 1 capsule by mouth Daily., Disp: , Rfl:   •  Unable to find, 1 each Daily. Vital gut Renew, Disp: , Rfl:   •  valACYclovir (Valtrex) 500 MG tablet, Take 1 tablet by mouth Daily., Disp: 90 tablet, Rfl: 2  •  VITAMIN B COMPLEX-C PO, Take 1 tablet by mouth Daily., Disp: , Rfl:   •  vitamin B-12 (CYANOCOBALAMIN) 500 MCG tablet, Take 500 mcg by mouth Daily., Disp: , Rfl:   •  methylPREDNISolone (MEDROL) 4 MG dose pack, Take as directed on package instructions., Disp: 21 each, Rfl: 0    Objective   Vital Signs  /60   Pulse 64   Temp 96.6 °F (35.9 °C) (Infrared)   Resp 16   Wt 69.5 kg (153 lb 3.2 oz)   SpO2 97%   BMI 24.50 kg/m²   Body mass index is 24.5 kg/m².     Physical Exam  Vitals and nursing note reviewed.   Constitutional:       Appearance: Normal appearance.   Cardiovascular:      Rate and Rhythm: Normal rate and regular rhythm.      Heart sounds: Normal heart sounds. No murmur heard.  Pulmonary:      Effort: No respiratory distress.      Breath sounds: Normal breath sounds. No stridor. No wheezing, rhonchi or rales.   Musculoskeletal:      Right hand: Swelling and tenderness present.      Left hand: Normal.   Skin:     Capillary Refill: Capillary refill takes less than 2 seconds.      Findings: Rash (Erythemic patch on forehead and below chin.) present.   Neurological:      General: No focal deficit present.      Mental Status: She is alert and oriented to person,  place, and time. Mental status is at baseline.      Gait: Gait normal.   Psychiatric:         Mood and Affect: Mood normal.         Behavior: Behavior normal.          Assessment and Plan  Diagnoses and all orders for this visit:    1. Swelling of right hand (Primary)  -     methylPREDNISolone (MEDROL) 4 MG dose pack; Take as directed on package instructions.  Dispense: 21 each; Refill: 0  -     XR Hand 3+ View Right; Future  -     Uric acid; Future    2. Thyroid dysfunction  -     TSH Rfx On Abnormal To Free T4; Future  -     Comprehensive metabolic panel; Future    3. Hyponatremia  -     Comprehensive metabolic panel; Future    4. Chronic midline low back pain without sciatica  -     XR Sacrum & Coccyx (In Office)             Follow Up    Return in about 4 weeks (around 4/25/2022).    Patient was given instructions and counseling regarding her condition or for health maintenance advice. Please see specific information pulled into the AVS if appropriate.    Electronically signed by:   Christophe Mathur MD  03/28/2022

## 2022-04-04 DIAGNOSIS — E03.9 ACQUIRED HYPOTHYROIDISM: ICD-10-CM

## 2022-04-04 RX ORDER — LEVOTHYROXINE SODIUM 0.05 MG/1
50 TABLET ORAL DAILY
Qty: 90 TABLET | Refills: 3 | Status: SHIPPED | OUTPATIENT
Start: 2022-04-04 | End: 2023-03-17

## 2022-04-14 ENCOUNTER — TELEHEALTH PROVIDED IN PATIENT'S HOME (OUTPATIENT)
Dept: URBAN - METROPOLITAN AREA TELEHEALTH 1 | Facility: TELEHEALTH | Age: 67
End: 2022-04-14

## 2022-04-14 VITALS — WEIGHT: 152 LBS | HEIGHT: 67 IN

## 2022-04-14 DIAGNOSIS — R14.0 ABDOMINAL DISTENSION (GASEOUS): ICD-10-CM

## 2022-04-14 DIAGNOSIS — K58.9 IRRITABLE BOWEL SYNDROME WITHOUT DIARRHEA: ICD-10-CM

## 2022-04-14 PROCEDURE — 99214 OFFICE O/P EST MOD 30 MIN: CPT | Mod: 95 | Performed by: NURSE PRACTITIONER

## 2022-06-29 DIAGNOSIS — B00.9 HERPES SIMPLEX: ICD-10-CM

## 2022-06-29 RX ORDER — VALACYCLOVIR HYDROCHLORIDE 500 MG/1
500 TABLET, FILM COATED ORAL DAILY
Qty: 90 TABLET | Refills: 2 | Status: SHIPPED | OUTPATIENT
Start: 2022-06-29 | End: 2023-03-27

## 2022-09-02 DIAGNOSIS — K58.2 IRRITABLE BOWEL SYNDROME WITH BOTH CONSTIPATION AND DIARRHEA: ICD-10-CM

## 2022-09-06 RX ORDER — HYOSCYAMINE SULFATE 0.125 MG
TABLET ORAL
Qty: 180 TABLET | Refills: 2 | Status: SHIPPED | OUTPATIENT
Start: 2022-09-06

## 2023-01-15 NOTE — PROGRESS NOTES
Here for physical    Exercise:  Diet:      Current Outpatient Medications:   •  Ginkgo Biloba (GNP GINGKO BILOBA EXTRACT PO), Take 1 capsule by mouth 2 (Two) Times a Day., Disp: , Rfl:   •  hyoscyamine (ANASPAZ,LEVSIN) 0.125 MG tablet, TAKE 1 TABLET BY MOUTH EVERY 6 HOURS AS NEEDED FOR CRAMPING, Disp: 180 tablet, Rfl: 2  •  Hypromellose (VISTA GEL DRY EYE RELIEF OP), Apply  to eye(s) as directed by provider., Disp: , Rfl:   •  levothyroxine (SYNTHROID, LEVOTHROID) 50 MCG tablet, TAKE 1 TABLET BY MOUTH DAILY, Disp: 90 tablet, Rfl: 3  •  methylPREDNISolone (MEDROL) 4 MG dose pack, Take as directed on package instructions., Disp: 21 each, Rfl: 0  •  Psyllium 100 % powder, Take 1 teaspoon(s) by mouth Daily., Disp: , Rfl:   •  sodium chloride (MONI 128) 5 % ophthalmic solution, Administer 1 drop to both eyes As Needed., Disp: , Rfl:   •  TURMERIC PO, Take 1 capsule by mouth Daily., Disp: , Rfl:   •  Unable to find, 1 each Daily. Vital gut Renew, Disp: , Rfl:   •  valACYclovir (VALTREX) 500 MG tablet, TAKE 1 TABLET BY MOUTH DAILY, Disp: 90 tablet, Rfl: 2  •  VITAMIN B COMPLEX-C PO, Take 1 tablet by mouth Daily., Disp: , Rfl:   •  vitamin B-12 (CYANOCOBALAMIN) 500 MCG tablet, Take 500 mcg by mouth Daily., Disp: , Rfl:     The following portions of the patient's history were reviewed and updated as appropriate: allergies, current medications, past family history, past medical history, past social history, past surgical history and problem list.    Review of Systems      Objective    There were no vitals taken for this visit.  Physical Exam   Physical Exam      Assessment/Plan       Regular exercise/healthy diet. BSE q month. Sunscreen use encouraged. Check fasting labs  DEXA due now- we will schedule   Had zostavax 9/13 - shingrix discussed -  can check at pharmacy since we do not have   Flu shot-  COVID bivalent-  Pneumonia vaccines-she has had Prevnar and Pneumovax   Colon was done in 10/13 (polyp, Dr Gamboa)  -due  10/23   Dt due in '24.  Living will-form given to pt and she will copy back when done  Mateo -due now ([facility])

## 2023-01-18 ENCOUNTER — OFFICE VISIT (OUTPATIENT)
Dept: INTERNAL MEDICINE | Facility: CLINIC | Age: 68
End: 2023-01-18
Payer: MEDICARE

## 2023-01-18 VITALS
HEART RATE: 56 BPM | HEIGHT: 66 IN | SYSTOLIC BLOOD PRESSURE: 122 MMHG | TEMPERATURE: 96.8 F | OXYGEN SATURATION: 97 % | DIASTOLIC BLOOD PRESSURE: 62 MMHG | BODY MASS INDEX: 22.66 KG/M2 | WEIGHT: 141 LBS

## 2023-01-18 DIAGNOSIS — M25.541 ARTHRALGIA OF RIGHT HAND: ICD-10-CM

## 2023-01-18 DIAGNOSIS — Z00.00 ROUTINE GENERAL MEDICAL EXAMINATION AT A HEALTH CARE FACILITY: ICD-10-CM

## 2023-01-18 DIAGNOSIS — Z78.0 POSTMENOPAUSAL: ICD-10-CM

## 2023-01-18 DIAGNOSIS — N32.81 OVERACTIVE BLADDER: ICD-10-CM

## 2023-01-18 DIAGNOSIS — L65.9 HAIR LOSS: ICD-10-CM

## 2023-01-18 DIAGNOSIS — E55.9 VITAMIN D DEFICIENCY, UNSPECIFIED: ICD-10-CM

## 2023-01-18 DIAGNOSIS — Z00.00 WELCOME TO MEDICARE PREVENTIVE VISIT: Primary | ICD-10-CM

## 2023-01-18 DIAGNOSIS — E03.9 HYPOTHYROIDISM (ACQUIRED): ICD-10-CM

## 2023-01-18 LAB
BILIRUB BLD-MCNC: NEGATIVE MG/DL
CLARITY, POC: CLEAR
COLOR UR: YELLOW
EXPIRATION DATE: NORMAL
GLUCOSE UR STRIP-MCNC: NEGATIVE MG/DL
KETONES UR QL: NEGATIVE
LEUKOCYTE EST, POC: NEGATIVE
Lab: NORMAL
NITRITE UR-MCNC: NEGATIVE MG/ML
PH UR: 6 [PH] (ref 5–8)
PROT UR STRIP-MCNC: NEGATIVE MG/DL
RBC # UR STRIP: NEGATIVE /UL
SP GR UR: 1.01 (ref 1–1.03)
UROBILINOGEN UR QL: NORMAL

## 2023-01-18 PROCEDURE — 1126F AMNT PAIN NOTED NONE PRSNT: CPT | Performed by: INTERNAL MEDICINE

## 2023-01-18 PROCEDURE — 99214 OFFICE O/P EST MOD 30 MIN: CPT | Performed by: INTERNAL MEDICINE

## 2023-01-18 PROCEDURE — 1170F FXNL STATUS ASSESSED: CPT | Performed by: INTERNAL MEDICINE

## 2023-01-18 PROCEDURE — 81003 URINALYSIS AUTO W/O SCOPE: CPT | Performed by: INTERNAL MEDICINE

## 2023-01-18 PROCEDURE — G0402 INITIAL PREVENTIVE EXAM: HCPCS | Performed by: INTERNAL MEDICINE

## 2023-01-18 PROCEDURE — G0403 EKG FOR INITIAL PREVENT EXAM: HCPCS | Performed by: INTERNAL MEDICINE

## 2023-01-18 PROCEDURE — 99397 PER PM REEVAL EST PAT 65+ YR: CPT | Performed by: INTERNAL MEDICINE

## 2023-01-18 PROCEDURE — 96160 PT-FOCUSED HLTH RISK ASSMT: CPT | Performed by: INTERNAL MEDICINE

## 2023-01-18 PROCEDURE — 1159F MED LIST DOCD IN RCRD: CPT | Performed by: INTERNAL MEDICINE

## 2023-01-18 RX ORDER — OXYBUTYNIN CHLORIDE 5 MG/1
5 TABLET, EXTENDED RELEASE ORAL NIGHTLY
Qty: 30 TABLET | Refills: 11 | Status: SHIPPED | OUTPATIENT
Start: 2023-01-18 | End: 2023-01-20 | Stop reason: SDUPTHER

## 2023-01-18 NOTE — PROGRESS NOTES
The ABCs of the Annual Wellness Visit  Welcome to Medicare Visit    Subjective     Palma Francisco is a 67 y.o. female who presents for a  Welcome to Medicare Visit and physical.    The following portions of the patient's history were reviewed and   updated as appropriate: allergies, current medications, past family history, past medical history, past social history, past surgical history and problem list.     Compared to one year ago, the patient feels her physical   health is the same.    Compared to one year ago, the patient feels her mental   health is the same.    Recent Hospitalizations:  She was not admitted to the hospital during the last year.           Current Medical Providers:  Patient Care Team:  Mikayla Branham MD as PCP - General (Internal Medicine)  Jesús Gamboa MD as Consulting Physician (Gastroenterology)  Juliane Lagos MD as Consulting Physician (Dermatology)  Optometrist-Heriberto deng  Dentist-Chase  Audiologist-Veronica Coto      Outpatient Medications Prior to Visit   Medication Sig Dispense Refill   • Ginkgo Biloba (GNP GINGKO BILOBA EXTRACT PO) Take 1 capsule by mouth 2 (Two) Times a Day.     • hyoscyamine (ANASPAZ,LEVSIN) 0.125 MG tablet TAKE 1 TABLET BY MOUTH EVERY 6 HOURS AS NEEDED FOR CRAMPING 180 tablet 2   • Hypromellose (VISTA GEL DRY EYE RELIEF OP) Apply  to eye(s) as directed by provider.     • levothyroxine (SYNTHROID, LEVOTHROID) 50 MCG tablet TAKE 1 TABLET BY MOUTH DAILY 90 tablet 3   • Psyllium 100 % powder Take 1 teaspoon(s) by mouth Daily.     • sodium chloride (MONI 128) 5 % ophthalmic solution Administer 1 drop to both eyes As Needed.     • TURMERIC PO Take 1 capsule by mouth Daily.     • Unable to find 1 each Daily. Vital gut Renew     • Unable to find 1 each Daily. Bone Broth     • valACYclovir (VALTREX) 500 MG tablet TAKE 1 TABLET BY MOUTH DAILY 90 tablet 2   • VITAMIN B COMPLEX-C PO Take 1 tablet by mouth Daily.     • vitamin B-12 (CYANOCOBALAMIN)  "500 MCG tablet Take 500 mcg by mouth Daily.     • methylPREDNISolone (MEDROL) 4 MG dose pack Take as directed on package instructions. 21 each 0     No facility-administered medications prior to visit.       No opioid medication identified on active medication list. I have reviewed chart for other potential  high risk medication/s and harmful drug interactions in the elderly.          Aspirin is not on active medication list.  Aspirin use is not indicated based on review of current medical condition/s. Risk of harm outweighs potential benefits.  .    Patient Active Problem List   Diagnosis   • Herpes simplex   • Disorder of bone and cartilage   • Migraine   • Irritable bowel syndrome with both constipation and diarrhea   • Hypothyroidism     Advance Care Planning  Advance Directive is not on file.  ACP discussion was held with the patient during this visit. Patient does not have an advance directive, information provided.       Objective   Vitals:    01/18/23 1026   BP: 122/62   BP Location: Right arm   Patient Position: Sitting   Pulse: 56   Temp: 96.8 °F (36 °C)   TempSrc: Infrared   SpO2: 97%   Weight: 64 kg (141 lb)   Height: 168.1 cm (66.2\")   PainSc: 0-No pain     Estimated body mass index is 22.62 kg/m² as calculated from the following:    Height as of this encounter: 168.1 cm (66.2\").    Weight as of this encounter: 64 kg (141 lb).    BMI is within normal parameters. No other follow-up for BMI required.      Does the patient have evidence of cognitive impairment?   No           ECG 12 Lead    Date/Time: 1/18/2023 10:30 AM  Performed by: Mikayla Branham MD  Authorized by: Mikayla Branham MD   Comparison: compared with previous ECG from 12/3/2019  Rhythm: sinus bradycardia  Rate: bradycardic  Conduction: conduction normal  T Waves: T waves normal  QRS axis: normal    Clinical impression: normal ECG               HEALTH RISK ASSESSMENT    Smoking Status:  Social History     Tobacco Use   Smoking Status Never "   Smokeless Tobacco Never     Alcohol Consumption:  Social History     Substance and Sexual Activity   Alcohol Use Not Currently    Comment: stopped drinking 3/2021       Fall Risk Screen:    ELIZABETH Fall Risk Assessment was completed, and patient is at LOW risk for falls.Assessment completed on:1/18/2023    Depression Screen:   PHQ-2/PHQ-9 Depression Screening 1/18/2023   Little Interest or Pleasure in Doing Things 0-->not at all   Feeling Down, Depressed or Hopeless 0-->not at all   PHQ-9: Brief Depression Severity Measure Score 0       Health Habits and Functional and Cognitive Screening:  Functional & Cognitive Status 1/18/2023   Do you have difficulty preparing food and eating? No   Do you have difficulty bathing yourself, getting dressed or grooming yourself? No   Do you have difficulty using the toilet? No   Do you have difficulty moving around from place to place? No   Do you have trouble with steps or getting out of a bed or a chair? No   Current Diet Well Balanced Diet   Dental Exam Up to date   Eye Exam Up to date   Exercise (times per week) 5 times per week   Current Exercises Include Walking   Do you need help using the phone?  No   Are you deaf or do you have serious difficulty hearing?  Yes   Do you need help with transportation? No   Do you need help shopping? No   Do you need help preparing meals?  No   Do you need help with housework?  No   Do you need help with laundry? No   Do you need help taking your medications? No   Do you need help managing money? No   Do you ever drive or ride in a car without wearing a seat belt? No   Have you felt unusual stress, anger or loneliness in the last month? No   Who do you live with? Spouse   If you need help, do you have trouble finding someone available to you? No   Have you been bothered in the last four weeks by sexual problems? No   Do you have difficulty concentrating, remembering or making decisions? No       Visual Acuity:    No results  found.    Age-appropriate Screening Schedule:  Refer to the list below for future screening recommendations based on patient's age, sex and/or medical conditions. Orders for these recommended tests are listed in the plan section. The patient has been provided with a written plan.    Health Maintenance   Topic Date Due   • DXA SCAN  12/26/2019   • ZOSTER VACCINE (2 of 3) 01/18/2023 (Originally 11/21/2013)   • PAP SMEAR  01/04/2024   • TDAP/TD VACCINES (2 - Td or Tdap) 10/27/2024   • MAMMOGRAM  01/03/2025   • INFLUENZA VACCINE  Completed        CMS Preventative Services Quick Reference  Risk Factors Identified During Encounter    None Identified  The above risks/problems have been discussed with the patient.  Pertinent information has been shared with the patient in the After Visit Summary.    Follow Up:   Initial Medicare Visit in one year    An After Visit Summary and PPPS were made available to the patient.      Additional E&M Note during same encounter follows:  Patient has multiple medical problems which are significant and separately identifiable that require additional work above and beyond the Medicare Wellness Visit.      Chief Complaint  Welcome To Medicare (Wellness), Annual Exam (physical), Alopecia (Still having hair loss.), ear issue (If she lays on her left side her ear fills up.), Insomnia (A few nights she will go sleepless for a couple of hours.), and Pain (Had severe pain in right thumb and right big toe in nail area.)    Subjective        HPI  Palma Francisco is also being seen today for     Hair loss-over last several years since she was diagnosed with hypothyroid she has noticed thinning and some hair recession in temples especially    Left ear fullness when she is laying on her left side.  No pain.  Does not usually bother her during the day.  Has not used any nasal sprays recently    Insomnia- some nights she will wake up after 4 hours and has trouble getting back to sleep. She has to go to  "bathroom several times at night, which is usually what wakes her up.  nO coffee, does drink decaf tea at lunch but no caffeine later in the day. No ETOH in last year. She has had the nocturia for several years.  During day not as much of an issue with overactive bladder, but does try to go regularly so she does not feel any urgency.  Has not been on any medication for overactive bladder    R thumb pain- DIP was hurting in 1st finger, but is better now. She used asper cream which helped. She had xr done last year which showes some swelling in the soft tissue    R gt toe pain - also has resolved.     Supplements-she does bone broth, centrum, b12, b complex  Exercise-she is walking regularly and will also try to do some classes at the [employer]  Diet-tries to eat healthy and not a lot of sweets but is limited with her GI symptoms as far as many vegetables and fruit that she can eat      Review of Systems   Constitutional: Negative for activity change, appetite change, fatigue and fever.   HENT: Positive for hearing loss (has hearing aids). Negative for ear pain.    Respiratory: Negative for shortness of breath.    Cardiovascular: Negative for chest pain and leg swelling.   Gastrointestinal: Negative for constipation (regular w/ fiber) and diarrhea.   Musculoskeletal: Positive for arthralgias and joint swelling.   Skin:        Hair thinning   Allergic/Immunologic: Negative for immunocompromised state.   Neurological: Negative for seizures, syncope, speech difficulty, weakness and confusion.   Psychiatric/Behavioral: Negative for dysphoric mood.       Objective   Vital Signs:  /62 (BP Location: Right arm, Patient Position: Sitting)   Pulse 56   Temp 96.8 °F (36 °C) (Infrared)   Ht 168.1 cm (66.2\")   Wt 64 kg (141 lb)   SpO2 97%   BMI 22.62 kg/m²     Physical Exam  Vitals and nursing note reviewed.   Constitutional:       General: She is not in acute distress.     Appearance: She is well-developed. " She is not diaphoretic.   HENT:      Head: Normocephalic and atraumatic.      Right Ear: External ear normal.      Left Ear: External ear normal.      Nose: Nose normal.      Mouth/Throat:      Pharynx: No oropharyngeal exudate.   Eyes:      General: No scleral icterus.        Right eye: No discharge.         Left eye: No discharge.      Conjunctiva/sclera: Conjunctivae normal.      Pupils: Pupils are equal, round, and reactive to light.   Neck:      Thyroid: No thyromegaly.   Cardiovascular:      Rate and Rhythm: Normal rate and regular rhythm.      Heart sounds: Normal heart sounds. No murmur heard.    No friction rub. No gallop.   Pulmonary:      Effort: Pulmonary effort is normal. No respiratory distress.      Breath sounds: Normal breath sounds. No wheezing or rales.   Chest:   Breasts:     Right: No mass, nipple discharge, skin change or tenderness.      Left: No mass, nipple discharge, skin change or tenderness.   Abdominal:      General: Bowel sounds are normal. There is no distension.      Palpations: Abdomen is soft. There is no mass.      Tenderness: There is no abdominal tenderness. There is no guarding or rebound.   Musculoskeletal:         General: No deformity. Normal range of motion.      Cervical back: Normal range of motion and neck supple.   Lymphadenopathy:      Cervical: No cervical adenopathy.   Skin:     General: Skin is warm and dry.      Coloration: Skin is not pale.      Findings: No erythema or rash.   Neurological:      Mental Status: She is alert and oriented to person, place, and time.      Coordination: Coordination normal.      Deep Tendon Reflexes: Reflexes normal.   Psychiatric:         Behavior: Behavior normal.         Thought Content: Thought content normal.         Judgment: Judgment normal.              The following data was reviewed by: Mikayla Branham MD on 01/18/2023:  Common labs    Common Labs 3/28/22 3/28/22    1043 1043   Glucose  91   BUN  10   Creatinine  0.83    Sodium  135 (A)   Potassium  4.2   Chloride  88 (A)   Calcium  10.0   Albumin  4.50   Total Bilirubin  0.4   Alkaline Phosphatase  100   AST (SGOT)  24   ALT (SGPT)  14   Uric Acid 3.7    (A) Abnormal value                    Assessment and Plan   Diagnoses and all orders for this visit:    1. Welcome to Medicare preventive visit (Primary)  -     ECG 12 Lead  Regular exercise/healthy diet. BSE q month. Sunscreen use encouraged. Check fasting labs  DEXA due now- we will schedule   Had zostavax 9/13 - shingrix discussed -  can check at pharmacy since we do not have   Flu shot-she had  COVID bivalent-she had  Pneumonia vaccines-she has had Prevnar and Pneumovax   Colon was done in 10/13 (polyp, Dr Gamboa)  -due 10/23   Dt due in '24.  Living will-form given to pt and she will copy back when done  Mateo -due 1/24 ([facility])  2. Routine general medical examination at a health care facility  -     CBC (No Diff); Future  -     TSH Rfx On Abnormal To Free T4; Future  -     Lipid Panel; Future  -     Comprehensive Metabolic Panel; Future  -     POC Urinalysis Dipstick, Automated    3. Hypothyroidism (acquired)-check levels  -     TSH Rfx On Abnormal To Free T4; Future  -     Lipid Panel; Future    4. Hair loss-we will recheck thyroid levels as well as some of the vitamin levels.  Discussed she could try Rogaine  -     Iron Profile; Future  -     Ferritin; Future  -     Vitamin D,25-Hydroxy; Future  -     Vitamin B12; Future    5. Arthralgia of right hand-please some start of osteoarthritis but symptoms have resolved    6. Vitamin D deficiency, unspecified  -     Vitamin D,25-Hydroxy; Future    7. Overactive bladder-probably because of the poor sleep and we will try oxybutynin at night and have her follow-up in 4 weeks.  Side effects of oxybutynin reviewed.  Call if any problems  -     oxybutynin XL (DITROPAN-XL) 5 MG 24 hr tablet; Take 1 tablet by mouth Every Night.  Dispense: 30 tablet; Refill: 11    8. Postmenopausal  -      DEXA Bone Density Axial; Future             Follow Up   Return in about 4 weeks (around 2/15/2023) for Next scheduled follow up.  Patient was given instructions and counseling regarding her condition or for health maintenance advice. Please see specific information pulled into the AVS if appropriate.

## 2023-01-19 ENCOUNTER — LAB (OUTPATIENT)
Dept: INTERNAL MEDICINE | Facility: CLINIC | Age: 68
End: 2023-01-19
Payer: MEDICARE

## 2023-01-19 DIAGNOSIS — E03.9 HYPOTHYROIDISM (ACQUIRED): ICD-10-CM

## 2023-01-19 DIAGNOSIS — E55.9 VITAMIN D DEFICIENCY, UNSPECIFIED: ICD-10-CM

## 2023-01-19 DIAGNOSIS — Z00.00 ROUTINE GENERAL MEDICAL EXAMINATION AT A HEALTH CARE FACILITY: Primary | ICD-10-CM

## 2023-01-19 DIAGNOSIS — L65.9 HAIR LOSS: ICD-10-CM

## 2023-01-19 LAB
25(OH)D3+25(OH)D2 SERPL-MCNC: 40.3 NG/ML (ref 30–100)
ALBUMIN SERPL-MCNC: 5 G/DL (ref 3.5–5.2)
ALBUMIN/GLOB SERPL: 2.6 G/DL
ALP SERPL-CCNC: 115 U/L (ref 39–117)
ALT SERPL-CCNC: 21 U/L (ref 1–33)
AST SERPL-CCNC: 27 U/L (ref 1–32)
BILIRUB SERPL-MCNC: 0.6 MG/DL (ref 0–1.2)
BUN SERPL-MCNC: 14 MG/DL (ref 8–23)
BUN/CREAT SERPL: 15.7 (ref 7–25)
CALCIUM SERPL-MCNC: 9.8 MG/DL (ref 8.6–10.5)
CHLORIDE SERPL-SCNC: 95 MMOL/L (ref 98–107)
CHOLEST SERPL-MCNC: 272 MG/DL (ref 0–200)
CO2 SERPL-SCNC: 31 MMOL/L (ref 22–29)
CREAT SERPL-MCNC: 0.89 MG/DL (ref 0.57–1)
EGFRCR SERPLBLD CKD-EPI 2021: 70.3 ML/MIN/1.73
ERYTHROCYTE [DISTWIDTH] IN BLOOD BY AUTOMATED COUNT: 12.4 % (ref 12.3–15.4)
FERRITIN SERPL-MCNC: 97.6 NG/ML (ref 13–150)
GLOBULIN SER CALC-MCNC: 1.9 GM/DL
GLUCOSE SERPL-MCNC: 82 MG/DL (ref 65–99)
HCT VFR BLD AUTO: 41.9 % (ref 34–46.6)
HDLC SERPL-MCNC: 101 MG/DL (ref 40–60)
HGB BLD-MCNC: 13.9 G/DL (ref 12–15.9)
IRON SATN MFR SERPL: 27 % (ref 20–50)
IRON SERPL-MCNC: 103 MCG/DL (ref 37–145)
LDLC SERPL CALC-MCNC: 162 MG/DL (ref 0–100)
MCH RBC QN AUTO: 31.7 PG (ref 26.6–33)
MCHC RBC AUTO-ENTMCNC: 33.2 G/DL (ref 31.5–35.7)
MCV RBC AUTO: 95.7 FL (ref 79–97)
PLATELET # BLD AUTO: 217 10*3/MM3 (ref 140–450)
POTASSIUM SERPL-SCNC: 4.1 MMOL/L (ref 3.5–5.2)
PROT SERPL-MCNC: 6.9 G/DL (ref 6–8.5)
RBC # BLD AUTO: 4.38 10*6/MM3 (ref 3.77–5.28)
SODIUM SERPL-SCNC: 134 MMOL/L (ref 136–145)
TIBC SERPL-MCNC: 381 MCG/DL
TRIGL SERPL-MCNC: 57 MG/DL (ref 0–150)
TSH SERPL DL<=0.005 MIU/L-ACNC: 1.51 UIU/ML (ref 0.27–4.2)
UIBC SERPL-MCNC: 278 MCG/DL (ref 112–346)
VIT B12 SERPL-MCNC: >2000 PG/ML (ref 211–946)
VLDLC SERPL CALC-MCNC: 9 MG/DL (ref 5–40)
WBC # BLD AUTO: 5.42 10*3/MM3 (ref 3.4–10.8)

## 2023-01-19 PROCEDURE — 36415 COLL VENOUS BLD VENIPUNCTURE: CPT | Performed by: INTERNAL MEDICINE

## 2023-01-20 DIAGNOSIS — N32.81 OVERACTIVE BLADDER: ICD-10-CM

## 2023-01-23 RX ORDER — OXYBUTYNIN CHLORIDE 5 MG/1
5 TABLET, EXTENDED RELEASE ORAL NIGHTLY
Qty: 30 TABLET | Refills: 11 | Status: SHIPPED | OUTPATIENT
Start: 2023-01-23 | End: 2023-03-01

## 2023-02-16 ENCOUNTER — OFFICE VISIT (OUTPATIENT)
Dept: INTERNAL MEDICINE | Facility: CLINIC | Age: 68
End: 2023-02-16
Payer: MEDICARE

## 2023-02-16 VITALS
HEART RATE: 75 BPM | DIASTOLIC BLOOD PRESSURE: 64 MMHG | WEIGHT: 138 LBS | TEMPERATURE: 96.8 F | OXYGEN SATURATION: 99 % | HEIGHT: 66 IN | BODY MASS INDEX: 22.18 KG/M2 | SYSTOLIC BLOOD PRESSURE: 116 MMHG

## 2023-02-16 DIAGNOSIS — N32.81 OVERACTIVE BLADDER: Primary | ICD-10-CM

## 2023-02-16 DIAGNOSIS — F51.01 PRIMARY INSOMNIA: ICD-10-CM

## 2023-02-16 DIAGNOSIS — E78.00 PURE HYPERCHOLESTEROLEMIA: Chronic | ICD-10-CM

## 2023-02-16 PROBLEM — E03.9 HYPOTHYROIDISM: Chronic | Status: ACTIVE | Noted: 2021-08-13

## 2023-02-16 PROCEDURE — 99214 OFFICE O/P EST MOD 30 MIN: CPT | Performed by: INTERNAL MEDICINE

## 2023-02-16 NOTE — PROGRESS NOTES
"Chief Complaint  overactive bladder (F/u on medication)    Subjective          Palma Francisco presents to [facility]  History of Present Illness  Answers for HPI/ROS submitted by the patient on 2/13/2023  Please describe your symptoms.: This is a follow up to my annual visit and perhaps regarding my newest prescription. I am still waking in the night to visit the bathroom and have trouble going back to a sound sleep. This occurs between 2 and 5 a.m.  Have you had these symptoms before?: Yes  How long have you been having these symptoms?: Greater than 2 weeks  Please list any medications you are currently taking for this condition.: Oxybutynin ER 5 mg tablet  Please describe any probable cause for these symptoms. : The need to visit the bathroom in the middle of the night.  What is the primary reason for your visit?: Other    The patient presents today for overactive bladder and hyperlipidemia.    Overactive bladder  The patient is taking oxybutynin XL 5 mg at night.  She has not noticed any significant change as far as getting up at night.  She wakes up still every night 1 time around 2 AM to use the restroom. She notes she stops drinking fluids at 6:30 pm, except for the sleepy time tea occasionally, not every night.  She feels like her bladder is full when she does go to the bathroom at 2 AM.  AThe patient urinates prior to getting in bed at night.  The patient reports some urinary incontinence just occasionally, about once a month generally at night when she has to get up to go to the bathroom-urine will gush out.    Insomnia-once she gets back to bed after urinating she has a hard time falling back asleep. The patient admits she has \"too much on her mind\", which causes her to stay awake at night. She reports falling asleep okay, however, she wakes up between 2:00-5:00 am and stays awake for about 2 hours. She notes getting about 4 hours of good sleep. She does feel like it is the " urge to urinate that wakes her up around 2:00 am. The patient goes to bed around 9:30-10:00 pm, she went to sleep around 11:00 pm last night due to a ball game. She wakes up around 6:30 am. She has tried melatonin, however, she complains of grogginess the following day. She took the melatonin prior to bed. The patient takes an Aleve at 2:00 am which helps her fall back asleep. . She has not tried CBD, her  uses topical CBD.     Abnormally high B12 levels-  The patient discontinued the B12 supplement.     Hyperlipidemia-LDL in the 160 range and HDL in the 100 range.  Diet is not bad but she is limited with the amount of vegetables and fruits she can eat because of GI intolerance.  She does like sweets so but is going to work on trying to cut these back and also try to cut back on cheese. The patient walks frequently approximately 5 days a week.              Current Outpatient Medications:   •  Ginkgo Biloba (GNP GINGKO BILOBA EXTRACT PO), Take 1 capsule by mouth 2 (Two) Times a Day., Disp: , Rfl:   •  hyoscyamine (ANASPAZ,LEVSIN) 0.125 MG tablet, TAKE 1 TABLET BY MOUTH EVERY 6 HOURS AS NEEDED FOR CRAMPING, Disp: 180 tablet, Rfl: 2  •  Hypromellose (VISTA GEL DRY EYE RELIEF OP), Apply  to eye(s) as directed by provider., Disp: , Rfl:   •  levothyroxine (SYNTHROID, LEVOTHROID) 50 MCG tablet, TAKE 1 TABLET BY MOUTH DAILY, Disp: 90 tablet, Rfl: 3  •  oxybutynin XL (DITROPAN-XL) 5 MG 24 hr tablet, Take 1 tablet by mouth Every Night., Disp: 30 tablet, Rfl: 11  •  Psyllium 100 % powder, Take 1 teaspoon(s) by mouth Daily., Disp: , Rfl:   •  sodium chloride (MONI 128) 5 % ophthalmic solution, Administer 1 drop to both eyes As Needed., Disp: , Rfl:   •  TURMERIC PO, Take 1 capsule by mouth Daily., Disp: , Rfl:   •  Unable to find, 1 each Daily. Bone Broth, Disp: , Rfl:   •  valACYclovir (VALTREX) 500 MG tablet, TAKE 1 TABLET BY MOUTH DAILY, Disp: 90 tablet, Rfl: 2   The following portions of the patient's history were  "reviewed and updated as appropriate: allergies, current medications, past family history, past medical history, past social history, past surgical history and problem list.     Objective   Vital Signs:   /64 (BP Location: Right arm, Patient Position: Sitting)   Pulse 75   Temp 96.8 °F (36 °C) (Infrared)   Ht 168.1 cm (66.2\")   Wt 62.6 kg (138 lb)   SpO2 99%   BMI 22.14 kg/m²       Physical exam  Constitutional: oriented to person, place, and time.  well-developed and well-nourished. No distress.   HENT:   Head: Normocephalic and atraumatic.   Eyes: Conjunctivae and EOM are normal.   Neurological:  alert and oriented to person, place, and time.   Skin: Skin is warm and dry. not diaphoretic.   Psychiatric:  normal mood and affect. behavior is normal. Judgment and thought content normal.      Result Review :   The following data was reviewed by: Mikayla Branham MD on 02/16/2023:  Common labs    Common Labs 3/28/22 3/28/22 1/19/23 1/19/23 1/19/23    1043 1043 0000 0000 0000   Glucose  91   82   BUN  10   14   Creatinine  0.83   0.89   Sodium  135 (A)   134 (A)   Potassium  4.2   4.1   Chloride  88 (A)   95 (A)   Calcium  10.0   9.8   Total Protein     6.9   Albumin  4.50   5.0   Total Bilirubin  0.4   0.6   Alkaline Phosphatase  100   115   AST (SGOT)  24   27   ALT (SGPT)  14   21   WBC   5.42     Hemoglobin   13.9     Hematocrit   41.9     Platelets   217     Total Cholesterol    272 (A)    Triglycerides    57    HDL Cholesterol    101 (A)    LDL Cholesterol     162 (A)    Uric Acid 3.7       (A) Abnormal value       Comments are available for some flowsheets but are not being displayed.           UA    Urinalysis 1/18/23   Ketones, UA Negative   Leukocytes, UA Negative                           Assessment and Plan    Diagnoses and all orders for this visit:    1. Overactive bladder (Primary)   We will go ahead and have her raise the oxybutynin into 10 mg at night. She wants to use what she has, so she " will take two of the 5 mg and will send us a message in a week or two and let us know how she's doing with this and if she's tolerating, I can send in the 10 mg to the pharmacy.  2. Pure hypercholesterolemia   We reviewed diet and she will try to cut back some on butter and red meat and we can recheck next year at her physical.  Her overall risk for heart disease is 7% based on her numbers  3. Primary insomnia   We discussed adding CBD oil when she can't fall back asleep after she has gotten up to go to the bathroom. Okay to continue the herbal tea at bedtime as well.      Follow Up   No follow-ups on file.  Patient was given instructions and counseling regarding her condition or for health maintenance advice. Please see specific information pulled into the AVS if appropriate.       Transcribed from ambient dictation for Mikayla Branham MD by Jasmin Panda.  02/16/23   12:03 EST    Patient or patient representative verbalized consent to the visit recording.  I have personally performed the services described in this document as transcribed by the above individual, and it is both accurate and complete.

## 2023-03-17 DIAGNOSIS — E03.9 ACQUIRED HYPOTHYROIDISM: ICD-10-CM

## 2023-03-17 RX ORDER — LEVOTHYROXINE SODIUM 0.05 MG/1
50 TABLET ORAL DAILY
Qty: 90 TABLET | Refills: 3 | Status: SHIPPED | OUTPATIENT
Start: 2023-03-17

## 2023-03-27 DIAGNOSIS — B00.9 HERPES SIMPLEX: ICD-10-CM

## 2023-03-27 RX ORDER — VALACYCLOVIR HYDROCHLORIDE 500 MG/1
500 TABLET, FILM COATED ORAL DAILY
Qty: 90 TABLET | Refills: 3 | Status: SHIPPED | OUTPATIENT
Start: 2023-03-27

## 2023-04-13 ENCOUNTER — OFFICE (OUTPATIENT)
Dept: URBAN - METROPOLITAN AREA CLINIC 4 | Facility: CLINIC | Age: 68
End: 2023-04-13

## 2023-04-13 VITALS — HEIGHT: 67 IN | SYSTOLIC BLOOD PRESSURE: 125 MMHG | WEIGHT: 139 LBS | DIASTOLIC BLOOD PRESSURE: 59 MMHG

## 2023-04-13 DIAGNOSIS — K64.0 FIRST DEGREE HEMORRHOIDS: ICD-10-CM

## 2023-04-13 DIAGNOSIS — R14.0 ABDOMINAL DISTENSION (GASEOUS): ICD-10-CM

## 2023-04-13 DIAGNOSIS — K58.0 IRRITABLE BOWEL SYNDROME WITH DIARRHEA: ICD-10-CM

## 2023-04-13 DIAGNOSIS — R14.1 GAS PAIN: ICD-10-CM

## 2023-04-13 DIAGNOSIS — Z12.11 ENCOUNTER FOR SCREENING FOR MALIGNANT NEOPLASM OF COLON: ICD-10-CM

## 2023-04-13 PROCEDURE — 99214 OFFICE O/P EST MOD 30 MIN: CPT | Performed by: NURSE PRACTITIONER

## 2023-04-13 RX ORDER — DICYCLOMINE HYDROCHLORIDE 10 MG/1
CAPSULE ORAL
Qty: 60 | Refills: 9 | Status: ACTIVE

## 2023-07-26 DIAGNOSIS — Z78.0 POSTMENOPAUSAL: Primary | ICD-10-CM

## 2023-10-04 ENCOUNTER — HOSPITAL ENCOUNTER (OUTPATIENT)
Dept: BONE DENSITY | Facility: HOSPITAL | Age: 68
Discharge: HOME | End: 2023-10-04
Admitting: INTERNAL MEDICINE
Payer: MEDICARE

## 2023-10-04 DIAGNOSIS — Z78.0 POSTMENOPAUSAL: ICD-10-CM

## 2023-10-04 PROCEDURE — 77080 DXA BONE DENSITY AXIAL: CPT

## 2023-10-30 ENCOUNTER — AMBULATORY SURGICAL CENTER (OUTPATIENT)
Dept: URBAN - METROPOLITAN AREA SURGERY 10 | Facility: SURGERY | Age: 68
End: 2023-10-30
Payer: MEDICARE

## 2023-10-30 DIAGNOSIS — K64.0 FIRST DEGREE HEMORRHOIDS: ICD-10-CM

## 2023-10-30 DIAGNOSIS — Z12.11 ENCOUNTER FOR SCREENING FOR MALIGNANT NEOPLASM OF COLON: ICD-10-CM

## 2023-10-30 PROCEDURE — G0121 COLON CA SCRN NOT HI RSK IND: HCPCS | Performed by: INTERNAL MEDICINE

## 2024-01-22 NOTE — PROGRESS NOTES
The ABCs of the Annual Wellness Visit  Initial Medicare Wellness Visit    Subjective     Palma Francisco is a 68 y.o. female who presents for an Initial Medicare Wellness Visit and physical    The following portions of the patient's history were reviewed and   updated as appropriate: allergies, current medications, past family history, past medical history, past social history, past surgical history, and problem list.     Compared to one year ago, the patient feels her physical   health is the same.    Compared to one year ago, the patient feels her mental   health is the same.    Recent Hospitalizations:  She was not admitted to the hospital during the last year.       Current Medical Providers:  Patient Care Team:  Mikayla Branham MD as PCP - General (Internal Medicine)  Jesús Gamboa MD as Consulting Physician (Gastroenterology)  Juliane Lagos MD as Consulting Physician (Dermatology)  Dr. Brendan Sandra (Dental General Practice)  [facility]    Outpatient Medications Prior to Visit   Medication Sig Dispense Refill    Calcium Carb-Cholecalciferol (CALCIUM 600 + D PO) Take 1 tablet by mouth Daily. 10 mcg of vitamin d      dicyclomine (BENTYL) 10 MG capsule Take 1 capsule by mouth Daily As Needed.      Emollient (Gold Bond Ultimate Overnight) lotion Apply 1 Application topically Every Night. Has hyaluronic acid, melatonin and other antioxidants.      Ginkgo Biloba (GNP GINGKO BILOBA EXTRACT PO) Take 1 capsule by mouth 2 (Two) Times a Day.      Hypromellose (VISTA GEL DRY EYE RELIEF OP) Apply  to eye(s) as directed by provider.      levothyroxine (SYNTHROID, LEVOTHROID) 50 MCG tablet TAKE 1 TABLET BY MOUTH DAILY 90 tablet 3    Psyllium 100 % powder Take 1 teaspoon(s) by mouth Daily.      sodium chloride (MONI 128) 5 % ophthalmic solution Administer 1 drop to both eyes As Needed.      TURMERIC PO Take 1 capsule by mouth Daily.      Unable to find 1 each Daily. Bone Broth 1/3 cup  "daily      valACYclovir (VALTREX) 500 MG tablet TAKE 1 TABLET BY MOUTH DAILY 90 tablet 3    hyoscyamine (ANASPAZ,LEVSIN) 0.125 MG tablet TAKE 1 TABLET BY MOUTH EVERY 6 HOURS AS NEEDED FOR CRAMPING 180 tablet 2     No facility-administered medications prior to visit.       No opioid medication identified on active medication list. I have reviewed chart for other potential  high risk medication/s and harmful drug interactions in the elderly.        Aspirin is not on active medication list.  Aspirin use is not indicated based on review of current medical condition/s. Risk of harm outweighs potential benefits.  .    Patient Active Problem List   Diagnosis    Herpes simplex    Disorder of bone and cartilage    Migraine    Irritable bowel syndrome with both constipation and diarrhea    Hypothyroidism    Pure hypercholesterolemia     Advance Care Planning   Advance Care Planning     Advance Directive is not on file.  ACP discussion was held with the patient during this visit. Patient does not have an advance directive, information provided.       Objective    Vitals:    01/25/24 0901   BP: 104/64   BP Location: Right arm   Patient Position: Sitting   Pulse: 64   Temp: 96.9 °F (36.1 °C)   TempSrc: Infrared   SpO2: 94%   Weight: 62.1 kg (137 lb)   Height: 168.3 cm (66.25\")   PainSc: 0-No pain     Estimated body mass index is 21.95 kg/m² as calculated from the following:    Height as of this encounter: 168.3 cm (66.25\").    Weight as of this encounter: 62.1 kg (137 lb).    BMI is within normal parameters. No other follow-up for BMI required.      Does the patient have evidence of cognitive impairment?   No          HEALTH RISK ASSESSMENT    Smoking Status:  Social History     Tobacco Use   Smoking Status Never   Smokeless Tobacco Never     Alcohol Consumption:  Social History     Substance and Sexual Activity   Alcohol Use Not Currently    Comment: I stopped social drinking Feb. 2021.     Fall Risk Screen:    ELIZABETH Fall Risk " Assessment was completed, and patient is at LOW risk for falls.Assessment completed on:2024    Depression Screen:       2024     9:01 AM   PHQ-2/PHQ-9 Depression Screening   Little Interest or Pleasure in Doing Things 0-->not at all   Feeling Down, Depressed or Hopeless 0-->not at all   PHQ-9: Brief Depression Severity Measure Score 0       Health Habits and Functional and Cognitive Screenin/25/2024     8:59 AM   Functional & Cognitive Status   Do you have difficulty preparing food and eating? No   Do you have difficulty bathing yourself, getting dressed or grooming yourself? No   Do you have difficulty using the toilet? No   Do you have difficulty moving around from place to place? No   Do you have trouble with steps or getting out of a bed or a chair? No   Current Diet Well Balanced Diet        Current Diet Comment no raw veggies or fruit, dairy free   Dental Exam Up to date   Eye Exam Up to date   Exercise (times per week) 4 times per week   Current Exercises Include Walking;Aerobics;Light Weights   Do you need help using the phone?  No   Are you deaf or do you have serious difficulty hearing?  Yes   Do you need help to go to places out of walking distance? No   Do you need help shopping? No   Do you need help preparing meals?  No   Do you need help with housework?  No   Do you need help with laundry? No   Do you need help taking your medications? No   Do you need help managing money? No   Do you ever drive or ride in a car without wearing a seat belt? No   Have you felt unusual stress, anger or loneliness in the last month? No   Who do you live with? Spouse   If you need help, do you have trouble finding someone available to you? No   Have you been bothered in the last four weeks by sexual problems? No   Do you have difficulty concentrating, remembering or making decisions? No       Age-appropriate Screening Schedule:  Refer to the list below for future screening recommendations based on  patient's age, sex and/or medical conditions. Orders for these recommended tests are listed in the plan section. The patient has been provided with a written plan.    Health Maintenance   Topic Date Due    ANNUAL WELLNESS VISIT  01/18/2024    LIPID PANEL  01/19/2024    ZOSTER VACCINE (2 of 3) 01/25/2024 (Originally 11/21/2013)    COVID-19 Vaccine (6 - 2023-24 season) 01/27/2024 (Originally 9/1/2023)    TDAP/TD VACCINES (2 - Td or Tdap) 10/27/2024    DXA SCAN  10/04/2025    MAMMOGRAM  01/04/2026    COLORECTAL CANCER SCREENING  10/30/2033    HEPATITIS C SCREENING  Completed    INFLUENZA VACCINE  Completed    Pneumococcal Vaccine 65+  Completed    PAP SMEAR  Discontinued          CMS Preventative Services Quick Reference  Risk Factors Identified During Encounter    None Identified    The above risks/problems have been discussed with the patient.  Pertinent information has been shared with the patient in the After Visit Summary.  An After Visit Summary and PPPS were made available to the patient.  Diagnoses and all orders for this visit:    1. Initial Medicare annual wellness visit (Primary)    2. Pure hypercholesterolemia  -     CBC (No Diff); Future  -     Lipid Panel; Future  -     Comprehensive Metabolic Panel; Future    3. Acquired hypothyroidism  -     TSH Rfx On Abnormal To Free T4; Future    4. Chronic right-sided low back pain without sciatica    5. Chronic pain of right knee    6. Chronic pain of right thumb    7. Ophthalmic migraine      Follow Up:  Next Medicare Wellness visit to be scheduled in 1 year.        Additional E&M Note during same encounter follows:  Patient has multiple medical problems which are significant and separately identifiable that require additional work above and beyond the Medicare Wellness Visit.      Chief Complaint  Medicare Wellness-subsequent (Wellness), Annual Exam (Physical), Hypothyroidism, finger bruise (Left hand on finger has had a bruise a couple of times.), Migraine (She  has had optical migraines within days of each other.), Back Pain (She can tell at times she has the arthritis in her back.), Arthritis (Right knee and right thumb besides her back.), and genetics test (She would like to discuss getting a genetics test.)    Subjective        HPI  Palma Francisco is also being seen today for:    Hypothyroid - on synthroid 50.  Weight stable from last year.  Has IBS.  Still with some hair loss    Optical migraines-has had intermittently over last 10 yrs. Had 3 recently close together which was unusual for her.did occur when we had the very cold weather earlier this month.  She sees [facility] yearly, in the fall, does have cataracts but does not need surgery yet, otherwise no changes in vision    Back pain- did have xr of coccyx in 3/22 which showed moderate SI arthritis and some lumbar spondylosis. Mostly in low back and the right side and sometimes in left leg. Is trying to do some stretching, and will take aleve occasionally, every few weeks    R thumb pain- will hurt through the whole digit and does some hand stretching    L 3rd finger 2 times in recent months she has bruised it w/ minimal trauma,    R knee pain- remote injury; will feel some pain if she has been on her feet a lot or doing a lot of house work. Will use asper cream as needed.    Genetic testing- wondering about genetic testing as she is adopted. She is wondering if she might have heart disease.  At times has some SOB w/ went up steep stairs generally no shortness of breath on a daily basis and no chest pain    insomnia-she has been trying a melatonin cream as the oral melatonin made her feel bad the next day    Supplements-she does  centrum, b12, b complex, ca/D, ginkgo x years, turmeric, psyllium     Exercise-she is walking regularly and does classes at the [employer] [facility]  Diet-tries to eat healthy and not a lot of sweets but is limited with her GI symptoms as far as many vegetables and fruit that she  "can eat        Review of Systems   Constitutional:  Negative for activity change, appetite change, fatigue and fever.   HENT:  Positive for hearing loss. Mouth sores: has hearing aids.   Eyes:  Positive for visual disturbance (opthalmic migraines).   Respiratory:  Negative for shortness of breath.    Cardiovascular:  Negative for chest pain and leg swelling.   Gastrointestinal:         IBS   Musculoskeletal:  Positive for arthralgias and back pain.   Allergic/Immunologic: Negative for immunocompromised state.   Neurological:  Negative for seizures, syncope, speech difficulty, weakness and confusion.   Hematological:  Bruises/bleeds easily.   Psychiatric/Behavioral:  Positive for sleep disturbance.        Objective   Vital Signs:  /64 (BP Location: Right arm, Patient Position: Sitting)   Pulse 64   Temp 96.9 °F (36.1 °C) (Infrared)   Ht 168.3 cm (66.25\")   Wt 62.1 kg (137 lb)   SpO2 94%   BMI 21.95 kg/m²     Physical Exam  Vitals and nursing note reviewed.   Constitutional:       General: She is not in acute distress.     Appearance: She is well-developed. She is not diaphoretic.   HENT:      Head: Normocephalic and atraumatic.      Right Ear: External ear normal.      Left Ear: External ear normal.      Nose: Nose normal.      Mouth/Throat:      Pharynx: No oropharyngeal exudate.   Eyes:      General: No scleral icterus.        Right eye: No discharge.         Left eye: No discharge.      Conjunctiva/sclera: Conjunctivae normal.      Pupils: Pupils are equal, round, and reactive to light.   Neck:      Thyroid: No thyromegaly.   Cardiovascular:      Rate and Rhythm: Normal rate and regular rhythm.      Heart sounds: Normal heart sounds. No murmur heard.     No friction rub. No gallop.   Pulmonary:      Effort: Pulmonary effort is normal. No respiratory distress.      Breath sounds: Normal breath sounds. No wheezing or rales.   Chest:   Breasts:     Right: No mass, nipple discharge, skin change or " tenderness.      Left: No mass, nipple discharge, skin change or tenderness.   Abdominal:      General: Bowel sounds are normal. There is no distension.      Palpations: Abdomen is soft. There is no mass.      Tenderness: There is no abdominal tenderness. There is no guarding or rebound.   Musculoskeletal:         General: Tenderness (R si joint) present. No swelling or deformity. Normal range of motion.      Cervical back: Normal range of motion and neck supple.      Comments: R knee crepitus, no tenderness. No tenderness over R thumb   Lymphadenopathy:      Cervical: No cervical adenopathy.   Skin:     General: Skin is warm and dry.      Coloration: Skin is not pale.      Findings: No erythema or rash.   Neurological:      Mental Status: She is alert and oriented to person, place, and time.      Coordination: Coordination normal.      Deep Tendon Reflexes: Reflexes normal.   Psychiatric:         Behavior: Behavior normal.         Thought Content: Thought content normal.         Judgment: Judgment normal.                         Assessment and Plan   Diagnoses and all orders for this visit:    1. Initial Medicare annual wellness visit (Primary)  DEXA due 10/25 (osteopenia)   Had zostavax 9/13 - shingrix discussed -  can check at pharmacy it sounds like she does not want to do  Flu shot-she had  COVID -she declines  RSV-I did recommend she get this as she does have a grandbaby that is about a-year-old  Pneumonia vaccines-she has had Prevnar 13 and Pneumovax   Colon-due 10/33   Dt due in 10/24.  Living will-form given to pt and she will copy back when done  Mateo -due 1/25 ([facility])  2. Pure hypercholesterolemia-check levels today.  She is asking about further testing for heart disease since she does not know her family history.  She does have some dyspnea on exertion at times and we did discuss a stress test today but she would like to find out if her insurance would cover so she will check with her insurance and let me  know.  We also discussed cardiac CRP and calcium scoring  -     CBC (No Diff); Future  -     Lipid Panel; Future  -     Comprehensive Metabolic Panel; Future    3. Acquired hypothyroidism-check levels today  -     TSH Rfx On Abnormal To Free T4; Future    4. Chronic right-sided low back pain without sciatica-discussed repeating x-rays and trying physical therapy but she would like to continue doing the exercises through the [employer] and will let me know if it worsens    5. Chronic pain of right knee-declines x-ray or further evaluation now but will continue working on leg stretching and strengthening    6. Chronic pain of right thumb-declines referral to hand Ortho    7. Ophthalmic migraine-likely secondary to the change in weather.  She will continue seeing her eye doctor yearly and if they do become more frequent this year, we could try preventative medication             Follow Up   Return in about 1 year (around 1/25/2025) for Medicare Wellness.  Patient was given instructions and counseling regarding her condition or for health maintenance advice. Please see specific information pulled into the AVS if appropriate.

## 2024-01-25 ENCOUNTER — OFFICE VISIT (OUTPATIENT)
Dept: INTERNAL MEDICINE | Facility: CLINIC | Age: 69
End: 2024-01-25
Payer: MEDICARE

## 2024-01-25 ENCOUNTER — LAB (OUTPATIENT)
Dept: INTERNAL MEDICINE | Facility: CLINIC | Age: 69
End: 2024-01-25
Payer: MEDICARE

## 2024-01-25 VITALS
HEART RATE: 64 BPM | OXYGEN SATURATION: 94 % | DIASTOLIC BLOOD PRESSURE: 64 MMHG | TEMPERATURE: 96.9 F | BODY MASS INDEX: 22.02 KG/M2 | SYSTOLIC BLOOD PRESSURE: 104 MMHG | HEIGHT: 66 IN | WEIGHT: 137 LBS

## 2024-01-25 DIAGNOSIS — G89.29 CHRONIC PAIN OF RIGHT KNEE: ICD-10-CM

## 2024-01-25 DIAGNOSIS — G89.29 CHRONIC RIGHT-SIDED LOW BACK PAIN WITHOUT SCIATICA: ICD-10-CM

## 2024-01-25 DIAGNOSIS — E78.00 PURE HYPERCHOLESTEROLEMIA: Chronic | ICD-10-CM

## 2024-01-25 DIAGNOSIS — M54.50 CHRONIC RIGHT-SIDED LOW BACK PAIN WITHOUT SCIATICA: ICD-10-CM

## 2024-01-25 DIAGNOSIS — Z00.00 INITIAL MEDICARE ANNUAL WELLNESS VISIT: Primary | ICD-10-CM

## 2024-01-25 DIAGNOSIS — M25.561 CHRONIC PAIN OF RIGHT KNEE: ICD-10-CM

## 2024-01-25 DIAGNOSIS — G43.109 OPHTHALMIC MIGRAINE: ICD-10-CM

## 2024-01-25 DIAGNOSIS — E03.9 ACQUIRED HYPOTHYROIDISM: Chronic | ICD-10-CM

## 2024-01-25 DIAGNOSIS — G89.29 CHRONIC PAIN OF RIGHT THUMB: ICD-10-CM

## 2024-01-25 DIAGNOSIS — M79.644 CHRONIC PAIN OF RIGHT THUMB: ICD-10-CM

## 2024-01-25 LAB
ALBUMIN SERPL-MCNC: 4.5 G/DL (ref 3.5–5.2)
ALBUMIN/GLOB SERPL: 1.8 G/DL
ALP SERPL-CCNC: 97 U/L (ref 39–117)
ALT SERPL W P-5'-P-CCNC: 18 U/L (ref 1–33)
ANION GAP SERPL CALCULATED.3IONS-SCNC: 10.4 MMOL/L (ref 5–15)
AST SERPL-CCNC: 30 U/L (ref 1–32)
BILIRUB SERPL-MCNC: 0.5 MG/DL (ref 0–1.2)
BUN SERPL-MCNC: 14 MG/DL (ref 8–23)
BUN/CREAT SERPL: 15.6 (ref 7–25)
CALCIUM SPEC-SCNC: 9.6 MG/DL (ref 8.6–10.5)
CHLORIDE SERPL-SCNC: 95 MMOL/L (ref 98–107)
CHOLEST SERPL-MCNC: 261 MG/DL (ref 0–200)
CO2 SERPL-SCNC: 28.6 MMOL/L (ref 22–29)
CREAT SERPL-MCNC: 0.9 MG/DL (ref 0.57–1)
DEPRECATED RDW RBC AUTO: 46.2 FL (ref 37–54)
EGFRCR SERPLBLD CKD-EPI 2021: 68.9 ML/MIN/1.73
ERYTHROCYTE [DISTWIDTH] IN BLOOD BY AUTOMATED COUNT: 13.1 % (ref 12.3–15.4)
GLOBULIN UR ELPH-MCNC: 2.5 GM/DL
GLUCOSE SERPL-MCNC: 84 MG/DL (ref 65–99)
HCT VFR BLD AUTO: 41.4 % (ref 34–46.6)
HDLC SERPL-MCNC: 99 MG/DL (ref 40–60)
HGB BLD-MCNC: 14 G/DL (ref 12–15.9)
LDLC SERPL CALC-MCNC: 153 MG/DL (ref 0–100)
LDLC/HDLC SERPL: 1.52 {RATIO}
MCH RBC QN AUTO: 32.6 PG (ref 26.6–33)
MCHC RBC AUTO-ENTMCNC: 33.8 G/DL (ref 31.5–35.7)
MCV RBC AUTO: 96.3 FL (ref 79–97)
PLATELET # BLD AUTO: 206 10*3/MM3 (ref 140–450)
PMV BLD AUTO: 9.8 FL (ref 6–12)
POTASSIUM SERPL-SCNC: 4.2 MMOL/L (ref 3.5–5.2)
PROT SERPL-MCNC: 7 G/DL (ref 6–8.5)
RBC # BLD AUTO: 4.3 10*6/MM3 (ref 3.77–5.28)
SODIUM SERPL-SCNC: 134 MMOL/L (ref 136–145)
TRIGL SERPL-MCNC: 57 MG/DL (ref 0–150)
TSH SERPL DL<=0.05 MIU/L-ACNC: 1.41 UIU/ML (ref 0.27–4.2)
VLDLC SERPL-MCNC: 9 MG/DL (ref 5–40)
WBC NRBC COR # BLD AUTO: 5.04 10*3/MM3 (ref 3.4–10.8)

## 2024-01-25 PROCEDURE — 36415 COLL VENOUS BLD VENIPUNCTURE: CPT | Performed by: INTERNAL MEDICINE

## 2024-01-25 PROCEDURE — 80061 LIPID PANEL: CPT | Performed by: INTERNAL MEDICINE

## 2024-01-25 PROCEDURE — 85027 COMPLETE CBC AUTOMATED: CPT | Performed by: INTERNAL MEDICINE

## 2024-01-25 PROCEDURE — 80053 COMPREHEN METABOLIC PANEL: CPT | Performed by: INTERNAL MEDICINE

## 2024-01-25 PROCEDURE — 84443 ASSAY THYROID STIM HORMONE: CPT | Performed by: INTERNAL MEDICINE

## 2024-01-25 RX ORDER — DICYCLOMINE HYDROCHLORIDE 10 MG/1
10 CAPSULE ORAL DAILY PRN
COMMUNITY
Start: 2023-11-29

## 2024-03-21 ENCOUNTER — TELEPHONE (OUTPATIENT)
Dept: INTERNAL MEDICINE | Facility: CLINIC | Age: 69
End: 2024-03-21
Payer: MEDICARE

## 2024-03-21 DIAGNOSIS — B00.9 HERPES SIMPLEX: ICD-10-CM

## 2024-03-21 RX ORDER — VALACYCLOVIR HYDROCHLORIDE 500 MG/1
500 TABLET, FILM COATED ORAL DAILY
Qty: 90 TABLET | Refills: 3 | Status: SHIPPED | OUTPATIENT
Start: 2024-03-21

## 2024-03-21 NOTE — TELEPHONE ENCOUNTER
----- Message from Palma Francisco sent at 3/21/2024  3:26 PM EDT -----  Regarding:  Prescription/change in Pharmacy  Contact: [phone]  valACYclovir 500 MG tablet    Commonly known as: VALTREX  Learn more  TAKE 1 TABLET BY MOUTH DAILY  Prescription Details  PrescribedMar 2023  Approved byMikayla Branham  Prescription [id number]    Please contact the [facility] [id number] at [address], [place], [place] to refill my  prescription by tomorrow. Let me know if you have any questions. Thank you, Palma Francisco.

## 2024-03-26 ENCOUNTER — PATIENT MESSAGE (OUTPATIENT)
Dept: INTERNAL MEDICINE | Facility: CLINIC | Age: 69
End: 2024-03-26
Payer: MEDICARE

## 2024-03-26 DIAGNOSIS — E03.9 ACQUIRED HYPOTHYROIDISM: ICD-10-CM

## 2024-03-26 RX ORDER — LEVOTHYROXINE SODIUM 0.05 MG/1
50 TABLET ORAL DAILY
Qty: 90 TABLET | Refills: 3 | Status: SHIPPED | OUTPATIENT
Start: 2024-03-26

## 2024-03-26 NOTE — TELEPHONE ENCOUNTER
From: Palma Francisco  To: Mikayla Branham  Sent: 3/26/2024 3:03 PM EDT  Subject:  Prescription/New Pharmacy request    levothyroxine 50 MCG tablet    Commonly known as: SYNTHROID, LEVOTHROID  Learn more  Prescription  on 2024. Refill requests will be sent to your provider for approval.  TAKE 1 TABLET BY MOUTH DAILY  Prescription Details  PrescribedMar2023  Approved byMikayla Branahm  Prescription [id number]  Refill Details  [id number] tablets  Day [id number]    To call in prescription, please contact the [facility] [phone] @ [facility] [place] [address].   Thank you in advance for your assistance.

## 2024-04-15 ENCOUNTER — OFFICE (OUTPATIENT)
Dept: URBAN - METROPOLITAN AREA CLINIC 4 | Facility: CLINIC | Age: 69
End: 2024-04-15
Payer: COMMERCIAL

## 2024-04-15 VITALS — HEIGHT: 67 IN | DIASTOLIC BLOOD PRESSURE: 66 MMHG | SYSTOLIC BLOOD PRESSURE: 108 MMHG | WEIGHT: 137 LBS

## 2024-04-15 DIAGNOSIS — K58.9 IRRITABLE BOWEL SYNDROME WITHOUT DIARRHEA: ICD-10-CM

## 2024-04-15 DIAGNOSIS — K21.9 GASTRO-ESOPHAGEAL REFLUX DISEASE WITHOUT ESOPHAGITIS: ICD-10-CM

## 2024-04-15 PROCEDURE — 99214 OFFICE O/P EST MOD 30 MIN: CPT | Performed by: NURSE PRACTITIONER

## 2025-01-29 ENCOUNTER — OFFICE VISIT (OUTPATIENT)
Dept: INTERNAL MEDICINE | Facility: CLINIC | Age: 70
End: 2025-01-29
Payer: MEDICARE

## 2025-01-29 ENCOUNTER — LAB (OUTPATIENT)
Dept: INTERNAL MEDICINE | Facility: CLINIC | Age: 70
End: 2025-01-29
Payer: MEDICARE

## 2025-01-29 VITALS
SYSTOLIC BLOOD PRESSURE: 120 MMHG | BODY MASS INDEX: 21.86 KG/M2 | HEART RATE: 70 BPM | TEMPERATURE: 96.6 F | WEIGHT: 136 LBS | DIASTOLIC BLOOD PRESSURE: 80 MMHG | OXYGEN SATURATION: 98 % | HEIGHT: 66 IN

## 2025-01-29 DIAGNOSIS — E78.00 PURE HYPERCHOLESTEROLEMIA: Chronic | ICD-10-CM

## 2025-01-29 DIAGNOSIS — E55.9 VITAMIN D DEFICIENCY: ICD-10-CM

## 2025-01-29 DIAGNOSIS — E03.9 ACQUIRED HYPOTHYROIDISM: Chronic | ICD-10-CM

## 2025-01-29 DIAGNOSIS — E04.1 THYROID CYST: ICD-10-CM

## 2025-01-29 DIAGNOSIS — K58.2 IRRITABLE BOWEL SYNDROME WITH BOTH CONSTIPATION AND DIARRHEA: ICD-10-CM

## 2025-01-29 DIAGNOSIS — Z00.00 MEDICARE ANNUAL WELLNESS VISIT, SUBSEQUENT: Primary | ICD-10-CM

## 2025-01-29 DIAGNOSIS — R13.19 OTHER DYSPHAGIA: ICD-10-CM

## 2025-01-29 LAB
25(OH)D3 SERPL-MCNC: 47.6 NG/ML (ref 30–100)
ALBUMIN SERPL-MCNC: 4.6 G/DL (ref 3.5–5.2)
ALBUMIN/GLOB SERPL: 1.8 G/DL
ALP SERPL-CCNC: 110 U/L (ref 39–117)
ALT SERPL W P-5'-P-CCNC: 19 U/L (ref 1–33)
ANION GAP SERPL CALCULATED.3IONS-SCNC: 13 MMOL/L (ref 5–15)
AST SERPL-CCNC: 34 U/L (ref 1–32)
BILIRUB BLD-MCNC: NEGATIVE MG/DL
BILIRUB SERPL-MCNC: 0.5 MG/DL (ref 0–1.2)
BUN SERPL-MCNC: 14 MG/DL (ref 8–23)
BUN/CREAT SERPL: 13.9 (ref 7–25)
CALCIUM SPEC-SCNC: 9.6 MG/DL (ref 8.6–10.5)
CHLORIDE SERPL-SCNC: 97 MMOL/L (ref 98–107)
CHOLEST SERPL-MCNC: 274 MG/DL (ref 0–200)
CLARITY, POC: CLEAR
CO2 SERPL-SCNC: 26 MMOL/L (ref 22–29)
COLOR UR: YELLOW
CREAT SERPL-MCNC: 1.01 MG/DL (ref 0.57–1)
DEPRECATED RDW RBC AUTO: 43.9 FL (ref 37–54)
EGFRCR SERPLBLD CKD-EPI 2021: 59.6 ML/MIN/1.73
ERYTHROCYTE [DISTWIDTH] IN BLOOD BY AUTOMATED COUNT: 12.1 % (ref 12.3–15.4)
EXPIRATION DATE: NORMAL
GLOBULIN UR ELPH-MCNC: 2.6 GM/DL
GLUCOSE SERPL-MCNC: 83 MG/DL (ref 65–99)
GLUCOSE UR STRIP-MCNC: NEGATIVE MG/DL
HCT VFR BLD AUTO: 43.7 % (ref 34–46.6)
HDLC SERPL-MCNC: 99 MG/DL (ref 40–60)
HGB BLD-MCNC: 14.9 G/DL (ref 12–15.9)
KETONES UR QL: NEGATIVE
LDLC SERPL CALC-MCNC: 166 MG/DL (ref 0–100)
LDLC/HDLC SERPL: 1.65 {RATIO}
LEUKOCYTE EST, POC: NEGATIVE
Lab: NORMAL
MCH RBC QN AUTO: 33.1 PG (ref 26.6–33)
MCHC RBC AUTO-ENTMCNC: 34.1 G/DL (ref 31.5–35.7)
MCV RBC AUTO: 97.1 FL (ref 79–97)
NITRITE UR-MCNC: NEGATIVE MG/ML
PH UR: 6.5 [PH] (ref 5–8)
PLATELET # BLD AUTO: 218 10*3/MM3 (ref 140–450)
PMV BLD AUTO: 9.9 FL (ref 6–12)
POTASSIUM SERPL-SCNC: 3.9 MMOL/L (ref 3.5–5.2)
PROT SERPL-MCNC: 7.2 G/DL (ref 6–8.5)
PROT UR STRIP-MCNC: NEGATIVE MG/DL
RBC # BLD AUTO: 4.5 10*6/MM3 (ref 3.77–5.28)
RBC # UR STRIP: NEGATIVE /UL
SODIUM SERPL-SCNC: 136 MMOL/L (ref 136–145)
SP GR UR: 1.01 (ref 1–1.03)
TRIGL SERPL-MCNC: 58 MG/DL (ref 0–150)
TSH SERPL DL<=0.05 MIU/L-ACNC: 1.09 UIU/ML (ref 0.27–4.2)
UROBILINOGEN UR QL: NORMAL
VLDLC SERPL-MCNC: 9 MG/DL (ref 5–40)
WBC NRBC COR # BLD AUTO: 5.73 10*3/MM3 (ref 3.4–10.8)

## 2025-01-29 PROCEDURE — 82306 VITAMIN D 25 HYDROXY: CPT | Performed by: INTERNAL MEDICINE

## 2025-01-29 PROCEDURE — 85027 COMPLETE CBC AUTOMATED: CPT | Performed by: INTERNAL MEDICINE

## 2025-01-29 PROCEDURE — 36415 COLL VENOUS BLD VENIPUNCTURE: CPT | Performed by: INTERNAL MEDICINE

## 2025-01-29 PROCEDURE — G0439 PPPS, SUBSEQ VISIT: HCPCS | Performed by: INTERNAL MEDICINE

## 2025-01-29 PROCEDURE — 80061 LIPID PANEL: CPT | Performed by: INTERNAL MEDICINE

## 2025-01-29 PROCEDURE — 80053 COMPREHEN METABOLIC PANEL: CPT | Performed by: INTERNAL MEDICINE

## 2025-01-29 PROCEDURE — 84443 ASSAY THYROID STIM HORMONE: CPT | Performed by: INTERNAL MEDICINE

## 2025-01-29 PROCEDURE — 81003 URINALYSIS AUTO W/O SCOPE: CPT | Performed by: INTERNAL MEDICINE

## 2025-01-29 PROCEDURE — 99397 PER PM REEVAL EST PAT 65+ YR: CPT | Performed by: INTERNAL MEDICINE

## 2025-01-29 NOTE — ASSESSMENT & PLAN NOTE
Previous patient of Dr. Gamboa, we will put in referral to see [facility] since Dr. Gamboa office is closed  Orders:    Ambulatory Referral to Gastroenterology

## 2025-01-29 NOTE — PROGRESS NOTES
I have a good night Subjective   The ABCs of the Annual Wellness Visit  Medicare Wellness Visit      Palma Francisco is a 69 y.o. patient who presents for a Medicare Wellness Visit.    The following portions of the patient's history were reviewed and   updated as appropriate: allergies, current medications, past family history, past medical history, past social history, past surgical history, and problem list.    Compared to one year ago, the patient's physical   health is worse.  Compared to one year ago, the patient's mental   health is the same.    Recent Hospitalizations:  She was not admitted to the hospital during the last year.     Current Medical Providers:  Patient Care Team:  Mikayla Branham MD as PCP - General (Internal Medicine)  Jesús Gamboa MD as Consulting Physician (Gastroenterology)  Juliane Lagos MD as Consulting Physician (Dermatology)  Dr. Brendan Sandra (Dental General Practice)  [facility]    Outpatient Medications Prior to Visit   Medication Sig Dispense Refill    dicyclomine (BENTYL) 10 MG capsule Take 1 capsule by mouth Daily As Needed.      Emollient (Gold Bond Ultimate Overnight) lotion Apply 1 Application topically Every Night. Has hyaluronic acid, melatonin and other antioxidants.      Ginkgo Biloba (GNP GINGKO BILOBA EXTRACT PO) Take 1 capsule by mouth 2 (Two) Times a Day.      Hypromellose (VISTA GEL DRY EYE RELIEF OP) Apply  to eye(s) as directed by provider.      levothyroxine (SYNTHROID, LEVOTHROID) 50 MCG tablet Take 1 tablet by mouth Daily. 90 tablet 3    polyethyl glycol-propyl glycol (SYSTANE) 0.4-0.3 % solution ophthalmic solution (artificial tears) Every 1 (One) Hour As Needed.      Probiotic Product (PROBIOTIC DAILY PO) Take  by mouth.      Psyllium 100 % powder Take 1 teaspoon(s) by mouth Daily.      sodium chloride (MONI 128) 5 % ophthalmic solution Administer 1 drop to both eyes As Needed.      TURMERIC PO Take 1 capsule by mouth  "Daily.      Unable to find 1 each Daily. Bone Broth 1/3 cup daily      valACYclovir (VALTREX) 500 MG tablet Take 1 tablet by mouth Daily. 90 tablet 3    Calcium Carb-Cholecalciferol (CALCIUM 600 + D PO) Take 1 tablet by mouth Daily. 10 mcg of vitamin d       No facility-administered medications prior to visit.     No opioid medication identified on active medication list. I have reviewed chart for other potential  high risk medication/s and harmful drug interactions in the elderly.      Aspirin is not on active medication list.  Aspirin use is not indicated based on review of current medical condition/s. Risk of harm outweighs potential benefits.  .    Patient Active Problem List   Diagnosis    Herpes simplex    Disorder of bone and cartilage    Migraine    Irritable bowel syndrome with both constipation and diarrhea    Hypothyroidism    Pure hypercholesterolemia     Advance Care Planning Advance Directive is not on file.  ACP discussion was held with the patient during this visit. Patient has an advance directive (not in EMR), copy requested.            Objective   Vitals:    01/29/25 0912   BP: 120/80   Pulse: 70   Temp: 96.6 °F (35.9 °C)   SpO2: 98%   Weight: 61.7 kg (136 lb)   Height: 168.3 cm (66.25\")   PainSc: 0-No pain       Estimated body mass index is 21.79 kg/m² as calculated from the following:    Height as of this encounter: 168.3 cm (66.25\").    Weight as of this encounter: 61.7 kg (136 lb).    BMI is within normal parameters. No other follow-up for BMI required.           Does the patient have evidence of cognitive impairment? No                                                                                                Health  Risk Assessment    Smoking Status:  Social History     Tobacco Use   Smoking Status Never   Smokeless Tobacco Never     Alcohol Consumption:  Social History     Substance and Sexual Activity   Alcohol Use Not Currently    Comment: I stopped social drinking Feb. 2021. "       Fall Risk Screen  STEADI Fall Risk Assessment was completed, and patient is at LOW risk for falls.Assessment completed on:2025    Depression Screening   Little interest or pleasure in doing things? Not at all   Feeling down, depressed, or hopeless? Not at all   PHQ-2 Total Score 0      Health Habits and Functional and Cognitive Screenin/28/2025     4:01 PM   Functional & Cognitive Status   Do you have difficulty preparing food and eating? No    Do you have difficulty bathing yourself, getting dressed or grooming yourself? No    Do you have difficulty using the toilet? No    Do you have difficulty moving around from place to place? No    Do you have trouble with steps or getting out of a bed or a chair? No    Current Diet Well Balanced Diet    Dental Exam Up to date    Eye Exam Up to date    Exercise (times per week) 5 times per week    Current Exercises Include Aerobics;House Cleaning;Light Weights;Walking;Yard Work    Do you need help using the phone?  No    Are you deaf or do you have serious difficulty hearing?  No    Do you need help to go to places out of walking distance? No    Do you need help shopping? No    Do you need help preparing meals?  No    Do you need help with housework?  No    Do you need help with laundry? No    Do you need help taking your medications? No    Do you need help managing money? No    Do you ever drive or ride in a car without wearing a seat belt? No    Have you felt unusual stress, anger or loneliness in the last month? No    Who do you live with? Spouse    If you need help, do you have trouble finding someone available to you? No    Have you been bothered in the last four weeks by sexual problems? No    Do you have difficulty concentrating, remembering or making decisions? No        Patient-reported           Age-appropriate Screening Schedule:  Refer to the list below for future screening recommendations based on patient's age, sex and/or medical conditions.  Orders for these recommended tests are listed in the plan section. The patient has been provided with a written plan.    Health Maintenance List  Health Maintenance   Topic Date Due    ZOSTER VACCINE (2 of 3) 11/21/2013    TDAP/TD VACCINES (2 - Td or Tdap) 10/27/2024    ANNUAL WELLNESS VISIT  01/25/2025    LIPID PANEL  01/25/2025    COVID-19 Vaccine (6 - 2024-25 season) 01/31/2025 (Originally 9/1/2024)    DXA SCAN  10/04/2025    MAMMOGRAM  01/07/2027    COLORECTAL CANCER SCREENING  10/30/2033    HEPATITIS C SCREENING  Completed    INFLUENZA VACCINE  Completed    Pneumococcal Vaccine 65+  Completed    PAP SMEAR  Discontinued                                                                                                                                                CMS Preventative Services Quick Reference  Risk Factors Identified During Encounter  None Identified    The above risks/problems have been discussed with the patient.  Pertinent information has been shared with the patient in the After Visit Summary.  An After Visit Summary and PPPS were made available to the patient.    Follow Up:   Next Medicare Wellness visit to be scheduled in 1 year.         Additional E&M Note during same encounter follows:  Patient has additional, significant, and separately identifiable condition(s)/problem(s) that require work above and beyond the Medicare Wellness Visit     Chief Complaint  Medicare Wellness-subsequent    Subjective   HPI      Review of Systems   Constitutional:  Negative for activity change and appetite change.   HENT:  Positive for hearing loss (has hearing aids) and trouble swallowing (occasionally).    Eyes:         Cataracts   Respiratory: Negative.     Gastrointestinal:  Positive for constipation (bm vary w/ the IBS). Negative for diarrhea.   Genitourinary: Negative.    Musculoskeletal:  Positive for arthralgias (R knee, R shoulder) and back pain.   Psychiatric/Behavioral:  Negative for decreased  "concentration and dysphoric mood.         She has noticed some trouble swallowing intermittently over the last 6 months. No trouble w/ liquids, just w/ foods and seems it is more noticeable if she has her head turned slightly.  She feels like it might be her thyroid gland.    Not had an EGD.  No GERD generally.  Did have a thyroid ultrasound in 2018 which showed a small cyst     L ear at times feels like there is some fluid in it    Hypothyroid - on synthroid 50.  Weight stable from last year.        R shoulder pain - will feel some pain after she does weights     R knee pain- remote injury; will feel some pain if she has been on her feet a lot or doing a lot of house work. Will use asper cream as needed.     Supplements-she does hair vitamin, probiotic every other day. centrum,  ginkgo x years, turmeric, psyllium    she stopped D last year    Exercise-she is walking regularly , light weights, housework  Diet-tries to eat healthy and not a lot of sweets but is limited with her GI symptoms as far as many vegetables and fruit that she can eat  No ETOH      Objective   Vital Signs:  /80   Pulse 70   Temp 96.6 °F (35.9 °C)   Ht 168.3 cm (66.25\")   Wt 61.7 kg (136 lb)   SpO2 98%   BMI 21.79 kg/m²   Physical Exam  Vitals and nursing note reviewed.   Constitutional:       General: She is not in acute distress.     Appearance: Normal appearance. She is well-developed. She is not diaphoretic.   HENT:      Head: Normocephalic and atraumatic.      Right Ear: External ear normal.      Left Ear: External ear normal.      Nose: Nose normal.      Mouth/Throat:      Pharynx: No oropharyngeal exudate.   Eyes:      General: No scleral icterus.        Right eye: No discharge.         Left eye: No discharge.      Conjunctiva/sclera: Conjunctivae normal.      Pupils: Pupils are equal, round, and reactive to light.   Neck:      Thyroid: No thyromegaly.      Vascular: No carotid bruit.      Comments: No thyroid masses " appreciated  Cardiovascular:      Rate and Rhythm: Normal rate and regular rhythm.      Heart sounds: Normal heart sounds. No murmur heard.     No friction rub. No gallop.   Pulmonary:      Effort: Pulmonary effort is normal. No respiratory distress.      Breath sounds: Normal breath sounds. No wheezing or rales.   Chest:   Breasts:     Right: No mass, nipple discharge, skin change or tenderness.      Left: No mass, nipple discharge, skin change or tenderness.   Abdominal:      General: Bowel sounds are normal. There is no distension.      Palpations: Abdomen is soft. There is no mass.      Tenderness: There is no abdominal tenderness. There is no guarding or rebound.   Musculoskeletal:         General: No deformity. Normal range of motion.      Cervical back: Normal range of motion and neck supple.   Lymphadenopathy:      Cervical: No cervical adenopathy.   Skin:     General: Skin is warm and dry.      Coloration: Skin is not pale.      Findings: No erythema or rash.   Neurological:      Mental Status: She is alert and oriented to person, place, and time.      Coordination: Coordination normal.      Deep Tendon Reflexes: Reflexes normal.   Psychiatric:         Behavior: Behavior normal.         Thought Content: Thought content normal.         Judgment: Judgment normal.                       Assessment and Plan            Medicare annual wellness visit, subsequent  DEXA due 10/25 (osteopenia)   Had zostavax 9/13 - shingrix discussed -  can check at pharmacy   Flu shot- she had this fall  COVID -she declines  RSV-  Shingles vaccine - discussed  Pneumonia vaccines-she has had Prevnar 13 and Pneumovax   Colon-due 10/33   Dt due now - check at pharmacy  Living will-patient brought in today  Mateo -due 1/26 ([facility])  Shoulder and knee pain-patient not quite ready to get x-rays or see Ortho  Orders:    POCT urinalysis dipstick, automated    Pure hypercholesterolemia  Check today    Orders:    CBC (No Diff); Future    Lipid  Panel; Future    Comprehensive Metabolic Panel; Future    Acquired hypothyroidism    Orders:    TSH Rfx On Abnormal To Free T4; Future    Vitamin D deficiency  We will recheck vitamin D since she is off vitamin D  Orders:    Vitamin D,25-Hydroxy; Future    Irritable bowel syndrome with both constipation and diarrhea  Previous patient of Dr. Gamboa, we will put in referral to see [facility] since Dr. Gamboa office is closed  Orders:    Ambulatory Referral to Gastroenterology    Other dysphagia  Patient feels it is more an external compression so we will go ahead and get repeat thyroid ultrasound.  If this is negative, we could consider endoscopy       Thyroid cyst    Orders:    US Thyroid; Future            Follow Up   No follow-ups on file.  Patient was given instructions and counseling regarding her condition or for health maintenance advice. Please see specific information pulled into the AVS if appropriate.

## 2025-01-29 NOTE — ASSESSMENT & PLAN NOTE
Check today    Orders:    CBC (No Diff); Future    Lipid Panel; Future    Comprehensive Metabolic Panel; Future

## 2025-03-12 DIAGNOSIS — B00.9 HERPES SIMPLEX: ICD-10-CM

## 2025-03-13 RX ORDER — VALACYCLOVIR HYDROCHLORIDE 500 MG/1
500 TABLET, FILM COATED ORAL DAILY
Qty: 90 TABLET | Refills: 3 | Status: SHIPPED | OUTPATIENT
Start: 2025-03-13

## 2025-03-28 DIAGNOSIS — E03.9 ACQUIRED HYPOTHYROIDISM: ICD-10-CM

## 2025-03-28 NOTE — TELEPHONE ENCOUNTER
Rx Refill Note  Requested Prescriptions     Pending Prescriptions Disp Refills    levothyroxine (SYNTHROID, LEVOTHROID) 50 MCG tablet [Pharmacy Med Name: LEVOTHYROXINE 50 MCG TABLET] 90 tablet 3     Sig: TAKE 1 TABLET BY MOUTH DAILY      Last office visit with prescribing clinician: 1/29/2025   Last telemedicine visit with prescribing clinician: Visit date not found   Next office visit with prescribing clinician: 2/4/2026   Last Filled: 3/26/24      Kayla Hamilton MA  03/28/25, 17:30 EDT

## 2025-03-29 RX ORDER — LEVOTHYROXINE SODIUM 50 UG/1
50 TABLET ORAL DAILY
Qty: 90 TABLET | Refills: 3 | Status: SHIPPED | OUTPATIENT
Start: 2025-03-29